# Patient Record
Sex: FEMALE | Race: WHITE | NOT HISPANIC OR LATINO | URBAN - METROPOLITAN AREA
[De-identification: names, ages, dates, MRNs, and addresses within clinical notes are randomized per-mention and may not be internally consistent; named-entity substitution may affect disease eponyms.]

---

## 2020-10-27 ENCOUNTER — EMERGENCY (EMERGENCY)
Facility: HOSPITAL | Age: 20
LOS: 0 days | Discharge: HOME | End: 2020-10-27
Attending: EMERGENCY MEDICINE | Admitting: EMERGENCY MEDICINE
Payer: COMMERCIAL

## 2020-10-27 VITALS
DIASTOLIC BLOOD PRESSURE: 78 MMHG | RESPIRATION RATE: 18 BRPM | HEART RATE: 116 BPM | WEIGHT: 100.97 LBS | OXYGEN SATURATION: 100 % | HEIGHT: 61 IN | SYSTOLIC BLOOD PRESSURE: 133 MMHG | TEMPERATURE: 99 F

## 2020-10-27 VITALS — HEART RATE: 88 BPM

## 2020-10-27 DIAGNOSIS — Z88.8 ALLERGY STATUS TO OTHER DRUGS, MEDICAMENTS AND BIOLOGICAL SUBSTANCES: ICD-10-CM
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
## 2020-10-27 DIAGNOSIS — S05.12XA CONTUSION OF EYEBALL AND ORBITAL TISSUES, LEFT EYE, INITIAL ENCOUNTER: ICD-10-CM
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
## 2020-10-27 DIAGNOSIS — F07.81 POSTCONCUSSIONAL SYNDROME: ICD-10-CM

## 2020-10-27 DIAGNOSIS — R11.0 NAUSEA: ICD-10-CM

## 2020-10-27 PROCEDURE — 99285 EMERGENCY DEPT VISIT HI MDM: CPT

## 2020-10-27 PROCEDURE — 70450 CT HEAD/BRAIN W/O DYE: CPT | Mod: 26

## 2020-10-27 PROCEDURE — 70480 CT ORBIT/EAR/FOSSA W/O DYE: CPT | Mod: 26,59

## 2020-10-27 RX ORDER — ACETAMINOPHEN 500 MG
975 TABLET ORAL ONCE
Refills: 0 | Status: COMPLETED | OUTPATIENT
Start: 2020-10-27 | End: 2020-10-27

## 2020-10-27 RX ORDER — ONDANSETRON 8 MG/1
4 TABLET, FILM COATED ORAL ONCE
Refills: 0 | Status: COMPLETED | OUTPATIENT
Start: 2020-10-27 | End: 2020-10-27

## 2020-10-27 RX ORDER — ONDANSETRON 8 MG/1
1 TABLET, FILM COATED ORAL
Qty: 9 | Refills: 0
Start: 2020-10-27 | End: 2020-10-29

## 2020-10-27 RX ADMIN — ONDANSETRON 4 MILLIGRAM(S): 8 TABLET, FILM COATED ORAL at 17:34

## 2020-10-27 RX ADMIN — Medication 975 MILLIGRAM(S): at 17:33

## 2020-10-27 NOTE — ED PROVIDER NOTE - CARE PROVIDER_API CALL
Sugey Cardona  [facility]AFF  [address] [place] [address]  Phone: [phone]  Fax: [phone]  Follow Up Time: 7-10 Days

## 2020-10-27 NOTE — ED PROVIDER NOTE - CARE PLAN
Principal Discharge DX:	Post-concussion syndrome  Secondary Diagnosis:	Vomiting  Secondary Diagnosis:	Periorbital ecchymosis of left eye, initial encounter  Secondary Diagnosis:	Headache

## 2020-10-27 NOTE — ED PROVIDER NOTE - NSFOLLOWUPINSTRUCTIONS_ED_ALL_ED_FT
Headache    A headache is pain or discomfort felt around the head or neck area. The specific cause of a headache may not be found as there are many types including tension headaches, migraine headaches, and cluster headaches. Watch your condition for any changes. Things you can do to manage your pain include taking over the counter and prescription medications as instructed by your health care provider, lying down in a dark quiet room, limiting stress, getting regular sleep, and refraining from alcohol and tobacco products.    SEEK IMMEDIATE MEDICAL CARE IF YOU EXPERIENCE THE FOLLOWING SYMPTOMS: fever, vomiting, stiff neck, loss of vision, problems with speech, muscle weakness, loss of balance, trouble walking, pass out, or confusion.    Concussion, Adult  A concussion is a brain injury from a direct hit (blow) to the head or body. This blow causes the brain to shake quickly back and forth inside the skull. This can damage brain cells and cause chemical changes in the brain. A concussion may also be known as a mild traumatic brain injury (TBI).    ImageConcussions are usually not life-threatening, but the effects of a concussion can be serious. If you have a concussion, you are more likely to experience concussion-like symptoms after a direct blow to the head in the future.    What are the causes?  This condition is caused by:    A direct blow to the head, such as from running into another player during a game, being hit in a fight, or hitting your head on a hard surface.  A jolt of the head or neck that causes the brain to move back and forth inside the skull, such as in a car crash.    What are the signs or symptoms?  The signs of a concussion can be hard to notice. Early on, they may be missed by you, family members, and health care providers. You may look fine but act or feel differently.    Symptoms are usually temporary, but they may last for days, weeks, or even longer. Some symptoms may appear right away but other symptoms may not show up for hours or days. Every head injury is different. Symptoms may include:    Headaches. This can include a feeling of pressure in the head.  Memory problems.  Trouble concentrating, organizing, or making decisions.  Slowness in thinking, acting or reacting, speaking, or reading.  Confusion.  Fatigue.  Changes in eating or sleeping patterns.  Problems with coordination or balance.  Nausea or vomiting.  Numbness or tingling.  Sensitivity to light or noise.  Vision or hearing problems.  Reduced sense of smell.  Irritability or mood changes.  Dizziness.  Lack of motivation.  Seeing or hearing things that other people do not see or hear (hallucinations).    How is this diagnosed?  This condition is diagnosed based on:    Your symptoms.  A description of your injury.    You may also have tests, including:    Imaging tests, such as a CT scan or MRI. These are done to look for signs of brain injury.  Neuropsychological tests. These measure your thinking, understanding, learning, and remembering abilities.    How is this treated?  This condition is treated with physical and mental rest and careful observation, usually at home. If the concussion is severe, you may need to stay home from work for a while. You may be referred to a concussion clinic or to other health care providers for management. It is important that you tell your health care provider if:    You are taking any medicines, including prescription medicines, over-the-counter medicines, and natural remedies. Some medicines, such as blood thinners (anticoagulants) and aspirin, may increase the chance of complications, such as bleeding.  You are taking or have taken alcohol or illegal drugs. Alcohol and certain other drugs may slow your recovery and can put you at risk of further injury.    How fast you will recover from a concussion depends on many factors, such as how severe your concussion is, what part of your brain was injured, how old you are, and how healthy you were before the concussion. Recovery can take time. It is important to wait to return to activity until a health care provider says it is safe to do that and your symptoms are completely gone.    Follow these instructions at home:  Activity     Limit activities that require a lot of thought or concentration. These may include:    Doing homework or job-related work.  Watching TV.  Working on the computer.  Playing memory games and puzzles.    Rest. Rest helps the brain to heal. Make sure you:    Get plenty of sleep at night. Avoid staying up late at night.  Keep the same bedtime hours on weekends and weekdays.  Rest during the day. Take naps or rest breaks when you feel tired.    Having another concussion before the first one has healed can be dangerous. Do not do high-risk activities that could cause a second concussion, such as riding a bicycle or playing sports.  Ask your health care provider when you can return to your normal activities, such as school, work, athletics, driving, riding a bicycle, or using heavy machinery. Your ability to react may be slower after a brain injury. Never do these activities if you are dizzy. Your health care provider will likely give you a plan for gradually returning to activities.  General instructions     Take over-the-counter and prescription medicines only as told by your health care provider.  Do not drink alcohol until your health care provider says you can.  If it is harder than usual to remember things, write them down.  If you are easily distracted, try to do one thing at a time. For example, do not try to watch TV while fixing dinner.  Talk with family members or close friends when making important decisions.  Watch your symptoms and tell others to do the same. Complications sometimes occur after a concussion. Older adults with a brain injury may have a higher risk of serious complications, such as a blood clot in the brain.  Tell your teachers, school nurse, school counselor, , , or  about your injury, symptoms, and restrictions. Tell them about what you can or cannot do. They should watch for:    Increased problems with attention or concentration.  Increased difficulty remembering or learning new information.  Increased time needed to complete tasks or assignments.  Increased irritability or decreased ability to cope with stress.  Increased symptoms.    Keep all follow-up visits as told by your health care provider. This is important.  How is this prevented?  It is very important to avoid another brain injury, especially as you recover. In rare cases, another injury can lead to permanent brain damage, brain swelling, or death. The risk of this is greatest during the first 7–10 days after a head injury. Avoid injuries by:    Wearing a seat belt when riding in a car.  Wearing a helmet when biking, skiing, skateboarding, skating, or doing similar activities.  Avoiding activities that could lead to a second concussion, such as contact or recreational sports, until your health care provider says it is okay.  Taking safety measures in your home, such as:    Removing clutter and tripping hazards from floors and stairways.  Using grab bars in bathrooms and handrails by stairs.  Placing non-slip mats on floors and in bathtubs.  Improving lighting in dim areas.      Contact a health care provider if:  Your symptoms get worse.  You have new symptoms.  You continue to have symptoms for more than 2 weeks.  Get help right away if:  You have severe or worsening headaches.  You have weakness or numbness in any part of your body.  Your coordination gets worse.  You vomit repeatedly.  You are sleepier.  The pupil of one eye is larger than the other.  You have convulsions or a seizure.  Your speech is slurred.  Your fatigue, confusion, or irritability gets worse.  You cannot recognize people or places.  You have neck pain.  It is difficult to wake you up.  You have unusual behavior changes.  You lose consciousness.  Summary  A concussion is a brain injury from a direct hit (blow) to the head or body.  A concussion may also be called a mild traumatic brain injury (TBI).  You may have imaging tests and neuropsychological tests to diagnose a concussion.  This condition is treated with physical and mental rest and careful observation.  Ask your health care provider when you can return to your normal activities, such as school, work, athletics, driving, riding a bicycle, or using heavy machinery. Follow safety instructions as told by your health care provider.  This information is not intended to replace advice given to you by your health care provider. Make sure you discuss any questions you have with your health care provider.

## 2020-10-27 NOTE — ED PROVIDER NOTE - CLINICAL SUMMARY MEDICAL DECISION MAKING FREE TEXT BOX
nv/post-concussive sx & L periorbital ecchymosis s/p head trauma - neuro exam nf - ct head/omfs no acute injuries - analgesia, zofran, all results d/w pt & copies given, strict return precautions discussed, rec outpt Concussion f/u

## 2020-10-27 NOTE — ED PROVIDER NOTE - ATTENDING CONTRIBUTION TO CARE
20F no pmh p/w head trauma & vomiting s/p mechanical fall. Pt was standing on a chair trying to reach a high cabinet, chair fell out from under her and she fell to floor. States she hit back of her head on floor, denies loc, states she sat on floor for a few minutes then got up and laid down on her bed and called her mother. C/o R post head pain & sustained mild L infra-orbital ecchymosis. Denies vision changes, neck pain, cp/sob, cough, focal numbness or weakness.    PE:  nad  skin warm, dry  ncat, +ttp R post parieto-occipital skull area, no hematoma or depressions  mild L infraorbital ecchymosis, no crepitus, perrl/eomi bl, non-painful, no hyphemas/hypopyons  neck supple no midline or paraspinal ttp  chest atx  rrr nl s1s2 no mrg  ctab no wrr  abd soft ntnd no palpable masses no rgr  back non-tender no cvat  ext no cce dpi  neuro gcs=15 aaox3, cn 2-12 intact bl no nystagmus or facial droop, 5/5 strength x 4 no drift, gross sensation intact, finger-nose nl, gait nl, romberg neg

## 2020-10-27 NOTE — ED PROVIDER NOTE - PATIENT PORTAL LINK FT
You can access the FollowMyHealth Patient Portal offered by [facility] by registering at the following website: [email or web address]. By joining [employer]’s FollowMyHealth portal, you will also be able to view your health information using other applications (apps) compatible with our system.

## 2020-10-27 NOTE — ED PROVIDER NOTE - OBJECTIVE STATEMENT
19 yo female with no pertinent pmh present s 21 yo female with no pertinent pmh present after a fall. pt states she was on a step stool to reach an object on top of her refrigerator when the stool 19 yo female with no pertinent pmh present after a fall. pt states she fell off a step stool while getting something off top of her refrigerator. pt states to have fell onto her back hitting her head with no LOC. pt had experienced n/v/headache since the event. denies any other pain at this time. denies any other symptoms including fevers, chill, headache, recent illness/travel, cough, abdominal pain, chest pain, or SOB.

## 2020-10-27 NOTE — ED ADULT NURSE NOTE - OBJECTIVE STATEMENT
pt presents s/p fall off chair at home and hit back of head on the tile floor. pt c/o n/v and dizziness

## 2020-10-27 NOTE — ED PROVIDER NOTE - PHYSICAL EXAMINATION
Gen: NAD, AOx3  Head: NCAT  HEENT: PERRL, oral mucosa moist, EOMI, normal conjunctiva, oropharynx clear without exudate or erythema, ecchymosis to L periorbit w/ TTP  Lung: CTAB, no respiratory distress, no wheezing, rales, rhonchi  CV: normal s1/s2, rrr, Normal perfusion, pulses 2+ throughout  Abd: soft, NTND, no CVA tenderness  Genitourinary: no pelvic tenderness  MSK: No edema, no visible deformities, full range of motion in all 4 extremities, no spinal tenderness   Neuro: CN II-XII grossly intact, No focal neurologic deficits, no nystagmus/pronator drift, coordination/sensation intact, strength 5/5 BUE/BLE, steady gait   Skin: No rash   Psych: normal affect

## 2021-01-04 ENCOUNTER — NON-APPOINTMENT (OUTPATIENT)
Age: 21
End: 2021-01-04

## 2021-01-04 ENCOUNTER — LABORATORY RESULT (OUTPATIENT)
Age: 21
End: 2021-01-04

## 2021-01-04 PROBLEM — Z00.00 ENCOUNTER FOR PREVENTIVE HEALTH EXAMINATION: Status: ACTIVE | Noted: 2021-01-04

## 2021-01-04 PROBLEM — Z78.9 OTHER SPECIFIED HEALTH STATUS: Chronic | Status: ACTIVE | Noted: 2020-10-27

## 2021-01-07 ENCOUNTER — LABORATORY RESULT (OUTPATIENT)
Age: 21
End: 2021-01-07

## 2021-01-07 ENCOUNTER — APPOINTMENT (OUTPATIENT)
Dept: OBGYN | Facility: CLINIC | Age: 21
End: 2021-01-07
Payer: COMMERCIAL

## 2021-01-07 VITALS — WEIGHT: 100 LBS | TEMPERATURE: 98 F | HEIGHT: 61 IN | BODY MASS INDEX: 18.88 KG/M2

## 2021-01-07 DIAGNOSIS — N91.1 SECONDARY AMENORRHEA: ICD-10-CM

## 2021-01-07 PROCEDURE — 99072 ADDL SUPL MATRL&STAF TM PHE: CPT

## 2021-01-07 PROCEDURE — 76830 TRANSVAGINAL US NON-OB: CPT

## 2021-01-07 PROCEDURE — 99203 OFFICE O/P NEW LOW 30 MIN: CPT | Mod: 25

## 2021-01-11 PROBLEM — N91.1 SECONDARY AMENORRHEA: Status: ACTIVE | Noted: 2021-01-11

## 2021-01-14 ENCOUNTER — NON-APPOINTMENT (OUTPATIENT)
Age: 21
End: 2021-01-14

## 2021-01-14 DIAGNOSIS — N89.8 OTHER SPECIFIED NONINFLAMMATORY DISORDERS OF VAGINA: ICD-10-CM

## 2021-01-16 ENCOUNTER — NON-APPOINTMENT (OUTPATIENT)
Age: 21
End: 2021-01-16

## 2021-01-27 ENCOUNTER — LABORATORY RESULT (OUTPATIENT)
Age: 21
End: 2021-01-27

## 2021-01-28 ENCOUNTER — APPOINTMENT (OUTPATIENT)
Dept: OBGYN | Facility: CLINIC | Age: 21
End: 2021-01-28
Payer: COMMERCIAL

## 2021-01-28 ENCOUNTER — NON-APPOINTMENT (OUTPATIENT)
Age: 21
End: 2021-01-28

## 2021-01-28 VITALS — TEMPERATURE: 98 F | WEIGHT: 102 LBS | BODY MASS INDEX: 19.26 KG/M2 | HEIGHT: 61 IN

## 2021-01-28 DIAGNOSIS — B37.3 CANDIDIASIS OF VULVA AND VAGINA: ICD-10-CM

## 2021-01-28 PROCEDURE — 0502F SUBSEQUENT PRENATAL CARE: CPT

## 2021-02-11 ENCOUNTER — APPOINTMENT (OUTPATIENT)
Dept: MATERNAL FETAL MEDICINE | Facility: CLINIC | Age: 21
End: 2021-02-11
Payer: COMMERCIAL

## 2021-02-11 ENCOUNTER — NON-APPOINTMENT (OUTPATIENT)
Age: 21
End: 2021-02-11

## 2021-02-11 PROCEDURE — 76801 OB US < 14 WKS SINGLE FETUS: CPT

## 2021-02-11 PROCEDURE — 99072 ADDL SUPL MATRL&STAF TM PHE: CPT

## 2021-02-11 PROCEDURE — 99243 OFF/OP CNSLTJ NEW/EST LOW 30: CPT

## 2021-02-11 PROCEDURE — 76813 OB US NUCHAL MEAS 1 GEST: CPT

## 2021-02-16 ENCOUNTER — APPOINTMENT (OUTPATIENT)
Dept: OBGYN | Facility: CLINIC | Age: 21
End: 2021-02-16

## 2021-02-16 ENCOUNTER — NON-APPOINTMENT (OUTPATIENT)
Age: 21
End: 2021-02-16

## 2021-02-22 ENCOUNTER — NON-APPOINTMENT (OUTPATIENT)
Age: 21
End: 2021-02-22

## 2021-02-24 ENCOUNTER — LABORATORY RESULT (OUTPATIENT)
Age: 21
End: 2021-02-24

## 2021-02-25 ENCOUNTER — NON-APPOINTMENT (OUTPATIENT)
Age: 21
End: 2021-02-25

## 2021-02-25 ENCOUNTER — APPOINTMENT (OUTPATIENT)
Dept: OBGYN | Facility: CLINIC | Age: 21
End: 2021-02-25
Payer: COMMERCIAL

## 2021-02-25 VITALS — BODY MASS INDEX: 19.45 KG/M2 | HEIGHT: 61 IN | TEMPERATURE: 98.2 F | WEIGHT: 103 LBS

## 2021-02-25 LAB
BILIRUB UR QL STRIP: NORMAL
CLARITY UR: CLEAR
GLUCOSE UR-MCNC: NORMAL
HCG UR QL: 0.2 EU/DL
HGB UR QL STRIP.AUTO: NORMAL
KETONES UR-MCNC: NORMAL
LEUKOCYTE ESTERASE UR QL STRIP: NORMAL
NITRITE UR QL STRIP: NORMAL
PH UR STRIP: 7
PROT UR STRIP-MCNC: NORMAL
SP GR UR STRIP: 1.02

## 2021-02-25 PROCEDURE — 0502F SUBSEQUENT PRENATAL CARE: CPT

## 2021-02-28 ENCOUNTER — LABORATORY RESULT (OUTPATIENT)
Age: 21
End: 2021-02-28

## 2021-03-08 ENCOUNTER — NON-APPOINTMENT (OUTPATIENT)
Age: 21
End: 2021-03-08

## 2021-03-09 ENCOUNTER — NON-APPOINTMENT (OUTPATIENT)
Age: 21
End: 2021-03-09

## 2021-03-18 ENCOUNTER — APPOINTMENT (OUTPATIENT)
Dept: OBGYN | Facility: CLINIC | Age: 21
End: 2021-03-18
Payer: COMMERCIAL

## 2021-03-18 VITALS — WEIGHT: 107 LBS | TEMPERATURE: 98.2 F | SYSTOLIC BLOOD PRESSURE: 100 MMHG | DIASTOLIC BLOOD PRESSURE: 60 MMHG

## 2021-03-18 PROCEDURE — 0502F SUBSEQUENT PRENATAL CARE: CPT

## 2021-03-18 RX ORDER — FLUCONAZOLE 150 MG/1
150 TABLET ORAL DAILY
Qty: 2 | Refills: 2 | Status: COMPLETED | COMMUNITY
Start: 2021-01-28 | End: 2021-03-18

## 2021-03-18 RX ORDER — METRONIDAZOLE 7.5 MG/G
0.75 GEL VAGINAL
Qty: 1 | Refills: 0 | Status: COMPLETED | COMMUNITY
Start: 2021-01-14 | End: 2021-03-18

## 2021-03-22 LAB
BILIRUB UR QL STRIP: NORMAL
GLUCOSE UR-MCNC: NORMAL
HCG UR QL: 0.2 EU/DL
HGB UR QL STRIP.AUTO: NORMAL
KETONES UR-MCNC: NORMAL
LEUKOCYTE ESTERASE UR QL STRIP: NORMAL
NITRITE UR QL STRIP: NORMAL
PH UR STRIP: 8
PROT UR STRIP-MCNC: NORMAL
SP GR UR STRIP: 1.02

## 2021-04-05 ENCOUNTER — NON-APPOINTMENT (OUTPATIENT)
Age: 21
End: 2021-04-05

## 2021-04-05 ENCOUNTER — APPOINTMENT (OUTPATIENT)
Dept: OBGYN | Facility: CLINIC | Age: 21
End: 2021-04-05
Payer: COMMERCIAL

## 2021-04-05 VITALS — BODY MASS INDEX: 20.77 KG/M2 | WEIGHT: 110 LBS | HEIGHT: 61 IN | TEMPERATURE: 98.3 F

## 2021-04-05 PROCEDURE — 0502F SUBSEQUENT PRENATAL CARE: CPT

## 2021-04-08 ENCOUNTER — APPOINTMENT (OUTPATIENT)
Dept: OBGYN | Facility: CLINIC | Age: 21
End: 2021-04-08
Payer: SELF-PAY

## 2021-04-08 PROCEDURE — 76805 OB US >/= 14 WKS SNGL FETUS: CPT

## 2021-04-09 ENCOUNTER — NON-APPOINTMENT (OUTPATIENT)
Age: 21
End: 2021-04-09

## 2021-04-28 ENCOUNTER — APPOINTMENT (OUTPATIENT)
Dept: OBGYN | Facility: CLINIC | Age: 21
End: 2021-04-28
Payer: MEDICAID

## 2021-04-28 VITALS — DIASTOLIC BLOOD PRESSURE: 60 MMHG | WEIGHT: 114 LBS | SYSTOLIC BLOOD PRESSURE: 100 MMHG | TEMPERATURE: 98 F

## 2021-04-28 PROCEDURE — 99213 OFFICE O/P EST LOW 20 MIN: CPT

## 2021-04-29 LAB
BILIRUB UR QL STRIP: NORMAL
GLUCOSE UR-MCNC: NORMAL
HCG UR QL: 0.2 EU/DL
HGB UR QL STRIP.AUTO: NORMAL
KETONES UR-MCNC: NORMAL
LEUKOCYTE ESTERASE UR QL STRIP: NORMAL
NITRITE UR QL STRIP: NORMAL
PH UR STRIP: 7.5
PROT UR STRIP-MCNC: NORMAL
SP GR UR STRIP: 1.02

## 2021-05-01 LAB
C TRACH RRNA SPEC QL NAA+PROBE: NOT DETECTED
N GONORRHOEA RRNA SPEC QL NAA+PROBE: NOT DETECTED
SOURCE AMPLIFICATION: NORMAL

## 2021-05-04 ENCOUNTER — LABORATORY RESULT (OUTPATIENT)
Age: 21
End: 2021-05-04

## 2021-05-04 ENCOUNTER — NON-APPOINTMENT (OUTPATIENT)
Age: 21
End: 2021-05-04

## 2021-05-10 ENCOUNTER — NON-APPOINTMENT (OUTPATIENT)
Age: 21
End: 2021-05-10

## 2021-05-11 ENCOUNTER — NON-APPOINTMENT (OUTPATIENT)
Age: 21
End: 2021-05-11

## 2021-05-11 ENCOUNTER — APPOINTMENT (OUTPATIENT)
Dept: OBGYN | Facility: CLINIC | Age: 21
End: 2021-05-11
Payer: MEDICAID

## 2021-05-11 VITALS — WEIGHT: 118 LBS | TEMPERATURE: 97.8 F

## 2021-05-11 LAB
BILIRUB UR QL STRIP: NORMAL
GLUCOSE UR-MCNC: NORMAL
HCG UR QL: 0.2 EU/DL
HGB UR QL STRIP.AUTO: NORMAL
KETONES UR-MCNC: NORMAL
LEUKOCYTE ESTERASE UR QL STRIP: NORMAL
NITRITE UR QL STRIP: NORMAL
PH UR STRIP: 6.5
PROT UR STRIP-MCNC: NORMAL
SP GR UR STRIP: 1.01

## 2021-05-11 PROCEDURE — 99213 OFFICE O/P EST LOW 20 MIN: CPT

## 2021-05-11 PROCEDURE — 99072 ADDL SUPL MATRL&STAF TM PHE: CPT

## 2021-05-18 ENCOUNTER — APPOINTMENT (OUTPATIENT)
Dept: OBGYN | Facility: CLINIC | Age: 21
End: 2021-05-18

## 2021-05-27 ENCOUNTER — EMERGENCY (EMERGENCY)
Facility: HOSPITAL | Age: 21
LOS: 0 days | Discharge: HOME | End: 2021-05-27
Attending: EMERGENCY MEDICINE | Admitting: EMERGENCY MEDICINE
Payer: MEDICAID

## 2021-05-27 VITALS
RESPIRATION RATE: 18 BRPM | DIASTOLIC BLOOD PRESSURE: 66 MMHG | OXYGEN SATURATION: 98 % | HEART RATE: 78 BPM | SYSTOLIC BLOOD PRESSURE: 122 MMHG | TEMPERATURE: 98 F | HEIGHT: 61 IN

## 2021-05-27 DIAGNOSIS — Z88.0 ALLERGY STATUS TO PENICILLIN: ICD-10-CM

## 2021-05-27 DIAGNOSIS — Z20.822 CONTACT WITH AND (SUSPECTED) EXPOSURE TO COVID-19: ICD-10-CM

## 2021-05-27 DIAGNOSIS — R11.0 NAUSEA: ICD-10-CM

## 2021-05-27 DIAGNOSIS — R10.31 RIGHT LOWER QUADRANT PAIN: ICD-10-CM
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
## 2021-05-27 DIAGNOSIS — Z3A.28 28 WEEKS GESTATION OF PREGNANCY: ICD-10-CM
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
## 2021-05-27 DIAGNOSIS — O9A.212 INJURY, POISONING AND CERTAIN OTHER CONSEQUENCES OF EXTERNAL CAUSES COMPLICATING PREGNANCY, SECOND TRIMESTER: ICD-10-CM

## 2021-05-27 LAB
ALBUMIN SERPL ELPH-MCNC: 3.5 G/DL — SIGNIFICANT CHANGE UP (ref 3.5–5.2)
ALP SERPL-CCNC: 66 U/L — SIGNIFICANT CHANGE UP (ref 30–115)
ALT FLD-CCNC: 9 U/L — SIGNIFICANT CHANGE UP (ref 0–41)
ANION GAP SERPL CALC-SCNC: 11 MMOL/L — SIGNIFICANT CHANGE UP (ref 7–14)
APPEARANCE UR: CLEAR — SIGNIFICANT CHANGE UP
AST SERPL-CCNC: 16 U/L — SIGNIFICANT CHANGE UP (ref 0–41)
BACTERIA # UR AUTO: NEGATIVE — SIGNIFICANT CHANGE UP
BASOPHILS # BLD AUTO: 0 K/UL — SIGNIFICANT CHANGE UP (ref 0–0.2)
BASOPHILS NFR BLD AUTO: 0 % — SIGNIFICANT CHANGE UP (ref 0–1)
BILIRUB SERPL-MCNC: <0.2 MG/DL — SIGNIFICANT CHANGE UP (ref 0.2–1.2)
BILIRUB UR-MCNC: NEGATIVE — SIGNIFICANT CHANGE UP
BLD GP AB SCN SERPL QL: SIGNIFICANT CHANGE UP
BUN SERPL-MCNC: 6 MG/DL — LOW (ref 10–20)
CALCIUM SERPL-MCNC: 8.8 MG/DL — SIGNIFICANT CHANGE UP (ref 8.5–10.1)
CHLORIDE SERPL-SCNC: 106 MMOL/L — SIGNIFICANT CHANGE UP (ref 98–110)
CO2 SERPL-SCNC: 23 MMOL/L — SIGNIFICANT CHANGE UP (ref 17–32)
COLOR SPEC: COLORLESS — SIGNIFICANT CHANGE UP
CREAT SERPL-MCNC: 0.6 MG/DL — LOW (ref 0.7–1.5)
DIFF PNL FLD: NEGATIVE — SIGNIFICANT CHANGE UP
EOSINOPHIL # BLD AUTO: 0.39 K/UL — SIGNIFICANT CHANGE UP (ref 0–0.7)
EOSINOPHIL NFR BLD AUTO: 3.5 % — SIGNIFICANT CHANGE UP (ref 0–8)
EPI CELLS # UR: 1 /HPF — SIGNIFICANT CHANGE UP (ref 0–5)
GIANT PLATELETS BLD QL SMEAR: PRESENT — SIGNIFICANT CHANGE UP
GLUCOSE SERPL-MCNC: 80 MG/DL — SIGNIFICANT CHANGE UP (ref 70–99)
GLUCOSE UR QL: NEGATIVE — SIGNIFICANT CHANGE UP
HCT VFR BLD CALC: 35.8 % — LOW (ref 37–47)
HGB BLD-MCNC: 11.9 G/DL — LOW (ref 12–16)
HYALINE CASTS # UR AUTO: 0 /LPF — SIGNIFICANT CHANGE UP (ref 0–7)
KETONES UR-MCNC: NEGATIVE — SIGNIFICANT CHANGE UP
LEUKOCYTE ESTERASE UR-ACNC: ABNORMAL
LIDOCAIN IGE QN: 30 U/L — SIGNIFICANT CHANGE UP (ref 7–60)
LYMPHOCYTES # BLD AUTO: 1.35 K/UL — SIGNIFICANT CHANGE UP (ref 1.2–3.4)
LYMPHOCYTES # BLD AUTO: 12.3 % — LOW (ref 20.5–51.1)
MANUAL SMEAR VERIFICATION: SIGNIFICANT CHANGE UP
MCHC RBC-ENTMCNC: 31.8 PG — HIGH (ref 27–31)
MCHC RBC-ENTMCNC: 33.2 G/DL — SIGNIFICANT CHANGE UP (ref 32–37)
MCV RBC AUTO: 95.7 FL — SIGNIFICANT CHANGE UP (ref 81–99)
METAMYELOCYTES # FLD: 1.8 % — HIGH (ref 0–0)
MONOCYTES # BLD AUTO: 0.48 K/UL — SIGNIFICANT CHANGE UP (ref 0.1–0.6)
MONOCYTES NFR BLD AUTO: 4.4 % — SIGNIFICANT CHANGE UP (ref 1.7–9.3)
MYELOCYTES NFR BLD: 2.6 % — HIGH (ref 0–0)
NEUTROPHILS # BLD AUTO: 8.29 K/UL — HIGH (ref 1.4–6.5)
NEUTROPHILS NFR BLD AUTO: 72.8 % — SIGNIFICANT CHANGE UP (ref 42.2–75.2)
NEUTS BAND # BLD: 2.6 % — SIGNIFICANT CHANGE UP (ref 0–6)
NITRITE UR-MCNC: NEGATIVE — SIGNIFICANT CHANGE UP
NRBC # BLD: 1 /100 — HIGH (ref 0–0)
NRBC # BLD: SIGNIFICANT CHANGE UP /100 WBCS (ref 0–0)
PH UR: 7.5 — SIGNIFICANT CHANGE UP (ref 5–8)
PLAT MORPH BLD: NORMAL — SIGNIFICANT CHANGE UP
PLATELET # BLD AUTO: 205 K/UL — SIGNIFICANT CHANGE UP (ref 130–400)
POLYCHROMASIA BLD QL SMEAR: SLIGHT — SIGNIFICANT CHANGE UP
POTASSIUM SERPL-MCNC: 3.5 MMOL/L — SIGNIFICANT CHANGE UP (ref 3.5–5)
POTASSIUM SERPL-SCNC: 3.5 MMOL/L — SIGNIFICANT CHANGE UP (ref 3.5–5)
PROT SERPL-MCNC: 5.6 G/DL — LOW (ref 6–8)
PROT UR-MCNC: NEGATIVE — SIGNIFICANT CHANGE UP
RAPID RVP RESULT: SIGNIFICANT CHANGE UP
RBC # BLD: 3.74 M/UL — LOW (ref 4.2–5.4)
RBC # FLD: 13.2 % — SIGNIFICANT CHANGE UP (ref 11.5–14.5)
RBC BLD AUTO: ABNORMAL
RBC CASTS # UR COMP ASSIST: 1 /HPF — SIGNIFICANT CHANGE UP (ref 0–4)
SARS-COV-2 RNA SPEC QL NAA+PROBE: SIGNIFICANT CHANGE UP
SODIUM SERPL-SCNC: 140 MMOL/L — SIGNIFICANT CHANGE UP (ref 135–146)
SP GR SPEC: 1 — LOW (ref 1.01–1.03)
TOXIC GRANULES BLD QL SMEAR: PRESENT — SIGNIFICANT CHANGE UP
UROBILINOGEN FLD QL: SIGNIFICANT CHANGE UP
WBC # BLD: 11 K/UL — HIGH (ref 4.8–10.8)
WBC # FLD AUTO: 11 K/UL — HIGH (ref 4.8–10.8)
WBC UR QL: 7 /HPF — HIGH (ref 0–5)

## 2021-05-27 PROCEDURE — 99285 EMERGENCY DEPT VISIT HI MDM: CPT

## 2021-05-27 PROCEDURE — 71045 X-RAY EXAM CHEST 1 VIEW: CPT | Mod: 26

## 2021-05-27 PROCEDURE — 99283 EMERGENCY DEPT VISIT LOW MDM: CPT

## 2021-05-27 PROCEDURE — 93010 ELECTROCARDIOGRAM REPORT: CPT

## 2021-05-27 RX ORDER — ONDANSETRON 8 MG/1
4 TABLET, FILM COATED ORAL ONCE
Refills: 0 | Status: COMPLETED | OUTPATIENT
Start: 2021-05-27 | End: 2021-05-27

## 2021-05-27 RX ORDER — ACETAMINOPHEN 500 MG
650 TABLET ORAL ONCE
Refills: 0 | Status: COMPLETED | OUTPATIENT
Start: 2021-05-27 | End: 2021-05-27

## 2021-05-27 RX ORDER — SODIUM CHLORIDE 9 MG/ML
1000 INJECTION, SOLUTION INTRAVENOUS ONCE
Refills: 0 | Status: COMPLETED | OUTPATIENT
Start: 2021-05-27 | End: 2021-05-27

## 2021-05-27 RX ADMIN — ONDANSETRON 4 MILLIGRAM(S): 8 TABLET, FILM COATED ORAL at 10:39

## 2021-05-27 RX ADMIN — SODIUM CHLORIDE 1000 MILLILITER(S): 9 INJECTION, SOLUTION INTRAVENOUS at 10:38

## 2021-05-27 NOTE — ED PROVIDER NOTE - PROGRESS NOTE DETAILS
BK: Bedside ultrasound shows FHR of 138 and negative FAST. Discussed case with OBGYN team. Will get basic labs before sending to OB. Trauma team recommends CXR for now and will be evaluated by Dr. Gomez. MR abd/pelvis ordered as per trauma, OB bedside with tocometer. The patient wishes to leave against medical advice.  I have discussed the risks, benefits and alternatives (including the possibility of worsening of disease, pain, permanent disability, and/or death) with the patient and his/her family (if available).  The patient voices understanding of these risks, benefits, and alternatives and still wishes to sign out against medical advice.  The patient is awake, alert, oriented x 3 and has demonstrated capacity to refuse/direct care.  I have advised the patient that they can and should return immediately should they develop any worse/different/additional symptoms, or if they change their mind and want to continue their care. Patient has full capacity and has verbalized understanding.  Given detailed returned precautions.

## 2021-05-27 NOTE — CONSULT NOTE ADULT - ATTENDING COMMENTS
pelvic tenderness with some guarding   28 Weeks pregnant   will obtain MRI of abd/Plvs   dispo pending MRI Results.

## 2021-05-27 NOTE — ED PROVIDER NOTE - PHYSICAL EXAMINATION
CONSTITUTIONAL: well-appearing pregnant female, in NAD  SKIN: Warm dry, normal skin turgor  HEAD: NCAT  EYES: EOMI, PERRLA, no scleral icterus, conjunctiva pink  ENT: normal pharynx with no erythema or exudates  NECK: Supple; non tender. Full ROM.  CARD: RRR, no murmurs.  RESP: clear to ausculation b/l. No crackles or wheezing.  ABD: soft, mildly tender to palpation of RLQ, non-distended, no rebound or guarding.  EXT: Full ROM, no bony tenderness, no pedal edema, no calf tenderness  NEURO: normal motor. normal sensory. CN II-XII intact. Normal gait.  PSYCH: Cooperative, appropriate.
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
## 2021-05-27 NOTE — ED PROVIDER NOTE - WR INTERPRETED BY 1
incomplete     HPI:  51 y/o F from [place], bedbound due to difficulty walking likely 2/2 to polyneuropathy with PMHx of anemia, HFpEF, ESRD on HD, Clostridium difficile infection, DM, Diabetic neuropathy, HTN, HLD, Hypothyroidism, OM of jaw, Parathyroid adenoma, and GERD was sent from NH due to leukocytosis of 20. Patient denies any complaints on admission including cough, SOB, chest pain, abdominal pain, nausea, vomiting, diarrhea, or pain at the sacral decubitus ulcer site. In the ED, patient was afebrile with leukocytosis of 19.4. She was last admitted at [facility] in March for sepsis. She underwent I&D on 3/8/18 of decubitus ulcer. Wound cx grew MRSA and patient continued to be febrile so she underwent redebridement on 3/18/18 and completed 2 weeks of Vancomycin on discharge. Currently she denies any complaints at all.    SH: Denies smoking, alcohol or illicit drug use. Wheelchair bound.   NKDA (2018 06:03)      PAST MEDICAL & SURGICAL HISTORY:  ESRD (end stage renal disease) on dialysis via right chest HD catheter   Clostridium difficile infection  Osteomyelitis of jaw  Parathyroid adenoma  Hypothyroidism  CKD (chronic kidney disease)  Anemia  CHF (congestive heart failure)  Diabetic neuropathy  Diabetes mellitus  HTN (hypertension)  S/P :   sacral decubitus debridement 3/2018        ALLERGIES: No Known Allergies      MEDS:  acetaminophen   Tablet 650 milliGRAM(s) Oral every 6 hours PRN  ascorbic acid 500 milliGRAM(s) Oral daily  cinacalcet 30 milliGRAM(s) Oral daily  dextrose 5%. 1000 milliLiter(s) IV Continuous <Continuous>  dextrose 50% Injectable 12.5 Gram(s) IV Push once  dextrose 50% Injectable 25 Gram(s) IV Push once  dextrose 50% Injectable 25 Gram(s) IV Push once  dextrose Gel 1 Dose(s) Oral once PRN  docusate sodium 100 milliGRAM(s) Oral two times a day  epoetin jacqueline Injectable 4000 Unit(s) IV Push <User Schedule>  ferrous    sulfate Liquid 300 milliGRAM(s) Enteral Tube daily  folic acid 1 milliGRAM(s) Oral daily  gabapentin 100 milliGRAM(s) Oral three times a day  glucagon  Injectable 1 milliGRAM(s) IntraMuscular once PRN  heparin  Injectable 5000 Unit(s) SubCutaneous every 8 hours  hydrALAZINE 50 milliGRAM(s) Oral three times a day  insulin lispro (HumaLOG) corrective regimen sliding scale   SubCutaneous three times a day before meals  levothyroxine 50 MICROGram(s) Oral daily  meropenem  IVPB 500 milliGRAM(s) IV Intermittent every 24 hours  metoprolol tartrate 25 milliGRAM(s) Oral two times a day  multivitamin 1 Tablet(s) Oral daily  multivitamin 1 Tablet(s) Oral daily  oxyCODONE    5 mG/acetaminophen 325 mG 1 Tablet(s) Oral every 6 hours PRN  pantoprazole    Tablet 40 milliGRAM(s) Oral before breakfast  senna 2 Tablet(s) Oral at bedtime  simvastatin 20 milliGRAM(s) Oral at bedtime      SOCIAL HISTORY:  Smoker:  NO       FAMILY HISTORY:  Family history of stroke  Family history of hypertension (Sibling)  Family history of diabetes mellitus      VITALS:  Vital Signs Last 24 Hrs  T(C): 36.7 (2018 05:04), Max: 36.8 (2018 15:26)  T(F): 98.1 (2018 05:04), Max: 98.3 (2018 15:26)  HR: 82 (2018 05:04) (73 - 84)  BP: 127/66 (2018 05:04) (110/54 - 137/59)  BP(mean): --  RR: 16 (2018 05:04) (16 - 18)  SpO2: 98% (2018 05:04) (98% - 100%)    LABS/DIAGNOSTIC TESTS:                        9.1    15.7  )-----------( 376      ( 2018 07:14 )             32.1     WBC Count: 15.7 K/uL ( @ 07:14)  WBC Count: 17.0 K/uL ( @ 18:21)  WBC Count: 18.0 K/uL ( @ 10:55)  WBC Count: 19.4 K/uL ( @ 02:32)        138  |  103  |  30<H>  ----------------------------<  115<H>  4.8   |  29  |  3.27<H>    Ca    9.4      2018 07:14  Phos  3.4     04-06  Mg     2.6     04-07      PT/INR - ( 2018 07:14 )   PT: 10.5 sec;   INR: 0.96 ratio         LACTATE:  Lactate, Blood (18 @ 02:32)    Lactate, Blood: 1.1 mmol/L    Comprehensive Metabolic Panel (18 @ 02:32)    Aspartate Aminotransferase (AST/SGOT): 13 U/L    Comprehensive Metabolic Panel (18 @ 02:32)    Alanine Aminotransferase (ALT/SGPT): 10 U/L DA      CULTURES:   .Blood Blood-Peripheral   @ 09:59   No growth to date.  --  --      .Blood Blood-Peripheral  03-10 @ 09:57   No growth at 5 days.  --  --      .Surgical Swab left back mass culture   @ 12:14   Numerous Methicillin resistant Staphylococcus aureus  --  Methicillin resistant Staphylococcus aureus      .Surgical Swab right side of back   @ 12:13   Numerous Methicillin resistant Staphylococcus aureus  ***********Note************  This isolate demonstrates inducible  clindamycin resistance.  Clindamycin may still be effective in some patients.  --  Methicillin resistant Staphylococcus aureus      .Blood Dialysis Catheter   @ 23:03   No growth at 5 days.  --  --      .Blood Blood-Peripheral   @ 09:43   No growth at 5 days.  --  --      .Urine Catheterized   @ 09:38   <10,000 CFU/ml Normal Urogenital derrick present  --  --      .Blood Blood  01-10 @ 00:04   No growth at 5 days.  --  --      RADIOLOGY:    < from: Xray Chest 1 View AP/PA (18 @ 04:26) >  EXAM:  XR CHEST AP OR PA 1V                            PROCEDURE DATE:  2018          INTERPRETATION:  AP chest on 2018 at 2:30 AM. Patient has   abnormal laboratory values and leukocytosis.    Heart is magnified by technique. Large-caliber double-lumen right jugular   line again noted.    Very prominent main pulmonary artery suggests pulmonary hypertension.    Left jugular line seen on  is been removed.    There is a dense longitudinal line paralleling the descending thoracic   aorta behind the heart again noted. This likely represents chronic   atelectasis or scarring.    Present film shows a slight hazy density in the right lower lung field   possibly minimal CHF. This may be increased from .    IMPRESSION:As above.      CHRISTIAN HARTLEY M.D., ATTENDING RADIOLOGIST  This document has been electronically signed. 2018  8:44AM        < end of copied text > Leonid Martines HPI:  51 y/o F from [place], bedbound due to difficulty walking likely 2/2 to polyneuropathy with PMHx of anemia, HFpEF, ESRD on HD, Clostridium difficile infection, DM, Diabetic neuropathy, HTN, HLD, Hypothyroidism, OM of jaw, Parathyroid adenoma, and GERD was sent from NH due to leukocytosis of 20. Patient denies any complaints on admission including cough, SOB, chest pain, abdominal pain, nausea, vomiting, diarrhea, or pain at the sacral decubitus ulcer site. In the ED, patient was afebrile with leukocytosis of 19.4. She was last admitted at [facility] in March for sepsis. She underwent I&D on 3/8/18 of decubitus ulcer. Wound cx grew MRSA and patient continued to be febrile so she underwent redebridement on 3/18/18 and completed 2 weeks of Vancomycin on discharge. Currently she denies any complaints at all.    SH: Denies smoking, alcohol or illicit drug use. Wheelchair bound.   NKDA (2018 06:03)      PAST MEDICAL & SURGICAL HISTORY:  ESRD (end stage renal disease) on dialysis via right chest HD catheter   Clostridium difficile infection  Osteomyelitis of jaw  Parathyroid adenoma  Hypothyroidism  CKD (chronic kidney disease)  Anemia  CHF (congestive heart failure)  Diabetic neuropathy  Diabetes mellitus  HTN (hypertension)  S/P :   sacral decubitus debridement 3/2018        ALLERGIES: No Known Allergies      MEDS:  acetaminophen   Tablet 650 milliGRAM(s) Oral every 6 hours PRN  ascorbic acid 500 milliGRAM(s) Oral daily  cinacalcet 30 milliGRAM(s) Oral daily  dextrose 5%. 1000 milliLiter(s) IV Continuous <Continuous>  dextrose 50% Injectable 12.5 Gram(s) IV Push once  dextrose 50% Injectable 25 Gram(s) IV Push once  dextrose 50% Injectable 25 Gram(s) IV Push once  dextrose Gel 1 Dose(s) Oral once PRN  docusate sodium 100 milliGRAM(s) Oral two times a day  epoetin jacqueline Injectable 4000 Unit(s) IV Push <User Schedule>  ferrous    sulfate Liquid 300 milliGRAM(s) Enteral Tube daily  folic acid 1 milliGRAM(s) Oral daily  gabapentin 100 milliGRAM(s) Oral three times a day  glucagon  Injectable 1 milliGRAM(s) IntraMuscular once PRN  heparin  Injectable 5000 Unit(s) SubCutaneous every 8 hours  hydrALAZINE 50 milliGRAM(s) Oral three times a day  insulin lispro (HumaLOG) corrective regimen sliding scale   SubCutaneous three times a day before meals  levothyroxine 50 MICROGram(s) Oral daily  meropenem  IVPB 500 milliGRAM(s) IV Intermittent every 24 hours  metoprolol tartrate 25 milliGRAM(s) Oral two times a day  multivitamin 1 Tablet(s) Oral daily  multivitamin 1 Tablet(s) Oral daily  oxyCODONE    5 mG/acetaminophen 325 mG 1 Tablet(s) Oral every 6 hours PRN  pantoprazole    Tablet 40 milliGRAM(s) Oral before breakfast  senna 2 Tablet(s) Oral at bedtime  simvastatin 20 milliGRAM(s) Oral at bedtime      SOCIAL HISTORY:  Smoker:  NO       FAMILY HISTORY:  Family history of stroke  Family history of hypertension (Sibling)  Family history of diabetes mellitus      VITALS:  Vital Signs Last 24 Hrs  T(C): 36.7 (2018 05:04), Max: 36.8 (2018 15:26)  T(F): 98.1 (2018 05:04), Max: 98.3 (2018 15:26)  HR: 82 (2018 05:04) (73 - 84)  BP: 127/66 (2018 05:04) (110/54 - 137/59)  BP(mean): --  RR: 16 (2018 05:04) (16 - 18)  SpO2: 98% (2018 05:04) (98% - 100%)    LABS/DIAGNOSTIC TESTS:                        9.1    15.7  )-----------( 376      ( 2018 07:14 )             32.1     WBC Count: 15.7 K/uL ( @ 07:14)  WBC Count: 17.0 K/uL ( @ 18:21)  WBC Count: 18.0 K/uL ( @ 10:55)  WBC Count: 19.4 K/uL ( @ 02:32)        138  |  103  |  30<H>  ----------------------------<  115<H>  4.8   |  29  |  3.27<H>    Ca    9.4      2018 07:14  Phos  3.4     04-06  Mg     2.6     04-07      PT/INR - ( 2018 07:14 )   PT: 10.5 sec;   INR: 0.96 ratio         LACTATE:  Lactate, Blood (18 @ 02:32)    Lactate, Blood: 1.1 mmol/L    Comprehensive Metabolic Panel (18 @ 02:32)    Aspartate Aminotransferase (AST/SGOT): 13 U/L    Comprehensive Metabolic Panel (18 @ 02:32)    Alanine Aminotransferase (ALT/SGPT): 10 U/L DA      CULTURES:   .Blood Blood-Peripheral   @ 09:59   No growth to date.  --  --      .Blood Blood-Peripheral  03-10 @ 09:57   No growth at 5 days.  --  --      .Surgical Swab left back mass culture   @ 12:14   Numerous Methicillin resistant Staphylococcus aureus  --  Methicillin resistant Staphylococcus aureus      .Surgical Swab right side of back   @ 12:13   Numerous Methicillin resistant Staphylococcus aureus  ***********Note************  This isolate demonstrates inducible  clindamycin resistance.  Clindamycin may still be effective in some patients.  --  Methicillin resistant Staphylococcus aureus      .Blood Dialysis Catheter   @ 23:03   No growth at 5 days.  --  --      .Blood Blood-Peripheral   @ 09:43   No growth at 5 days.  --  --      .Urine Catheterized   @ 09:38   <10,000 CFU/ml Normal Urogenital derrick present  --  --      .Blood Blood  -10 @ 00:04   No growth at 5 days.  --  --      RADIOLOGY:    < from: Xray Chest 1 View AP/PA (18 @ 04:26) >  EXAM:  XR CHEST AP OR PA 1V                            PROCEDURE DATE:  2018          INTERPRETATION:  AP chest on 2018 at 2:30 AM. Patient has   abnormal laboratory values and leukocytosis.    Heart is magnified by technique. Large-caliber double-lumen right jugular   line again noted.    Very prominent main pulmonary artery suggests pulmonary hypertension.    Left jugular line seen on  is been removed.    There is a dense longitudinal line paralleling the descending thoracic   aorta behind the heart again noted. This likely represents chronic   atelectasis or scarring.    Present film shows a slight hazy density in the right lower lung field   possibly minimal CHF. This may be increased from .    IMPRESSION:As above.      CHRISTIAN HARTLEY M.D., ATTENDING RADIOLOGIST  This document has been electronically signed. 2018  8:44AM        < end of copied text >
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
## 2021-05-27 NOTE — ED PROVIDER NOTE - PATIENT PORTAL LINK FT
You can access the FollowMyHealth Patient Portal offered by [facility] by registering at the following website: [email or web address]. By joining [facility]’s FollowMyHealth portal, you will also be able to view your health information using other applications (apps) compatible with our system.
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
## 2021-05-27 NOTE — ED PROVIDER NOTE - CARE PROVIDER_API CALL
Preston Lawton  Obstetrics and Gynecology  [address] [place] [address]  Phone: [phone]  Fax: [phone]  Follow Up Time:

## 2021-05-27 NOTE — ED ADULT NURSE NOTE - NSIMPLEMENTINTERV_GEN_ALL_ED
Implemented All Universal Safety Interventions:  [employer] to call system. Call bell, personal items and telephone within reach. Instruct patient to call for assistance. Room bathroom lighting operational. Non-slip footwear when patient is off stretcher. Physically safe environment: no spills, clutter or unnecessary equipment. Stretcher in lowest position, wheels locked, appropriate side rails in place.

## 2021-05-27 NOTE — CONSULT NOTE ADULT - ASSESSMENT
21yo  @28w0d, s/p fall, now wander irregularly, BPP 10/10, r/o abruption    -Patient counseled about need for prolonged monitoring for   -Rh positive  -f/u  21yo  @28w0d, s/p fall, now wander irregularly, BPP 10/10, r/o abruption    -Patient counseled about need for prolonged monitoring for suspected abruption after abdominal trauma, especially in the setting of irregular contractions at 28wks. She refused prolonged monitoring and opted to go home.  -Rh positive  -f/u fibrinogen, coags  -labor precautions discussed  -f/u during next appointment next week    Dr. Brennan and Dr. Lawton aware
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
## 2021-05-27 NOTE — ED PROVIDER NOTE - IV ALTEPASE ADMIN HIDDEN
"Office Visit    Assessment      1. Vaginal discharge. 2. Low back pain. PLAN    1. Specimens are sent for urinalysis and vaginal pathogens. 2. She is scheduled for an ultrasound of her pelvis to evaluate her continued right lower quadrant abdominal pain. .  3. Further management dependent on the above results. Orders Placed This Encounter   â¢ URINALYSIS DIPSTICK ONLY / BACK OFFICE   â¢ Urinalysis with Micro & Culture if Indicated   â¢ Vaginal Pathogens   â¢ US Pelvis Complete Non OB     Return if symptoms worsen or fail to improve. CHIEF COMPLAINT    Chief Complaint   Patient presents with   â¢ Infection     Pain, discharge, back pain x 3/4 days          History of present illness    She is here today for ""fishy smelling, creamy\"" vaginal discharge that has been going on for the past 3-4 days. She denies any itching. She denies any fever. She states that she is having right lower quadrant abdominal pain that has been going on intermittently since July. At that time she did have a pelvic ultrasound which showed a 1.3 cm likely complex ovarian cyst She denies dysuria, frequency, urgency, nocturia or hesitancy. The patient complains of pain across her low back that has also been going on for the past 3-4 days. She states the discomfort isn't aching type of pain. She states that the pain comes and goes but is worse in the morning. She denies any injury or unusual activity. I have reviewed the allergies, medication list and past medical, family and social history sections listed in the above medical record as well as any pertinent nursing notes. Physical Exam    Vital Signs:    Vitals:    10/25/18 1435   BP: 116/70   Pulse: 86   Weight: 52.3 kg   Height: 5' (1.524 m)   Body mass index is 22.54 kg/mÂ². General:  Well developed, well nourished. In no apparent distress. Skin:   Warm, dry and well hydrated to inspection and palpation. Respiratory:  Normal respiratory effort.   No chest wall " tenderness. Lungs clear to auscultation bilaterally. Symmetrical chest expansion. Cardiovascular:   Regular rate and rhythm. No murmurs. No peripheral edema. Abdomen: Bowel sounds are present in all 4 quadrants. Abdomen is soft and nontender on palpation. There is no hepatosplenomegaly or masses appreciated. .  Genitourinary:  No swelling tenderness or lesions to the external genitalia. Internal genitalia: Vagina is pink and well rugated. No lesions. Cervix is pink with no lesions. There is creamy white discharge in the vagina. Bimanual exam: Uterus is small and firm. No adnexal masses enlargement or tenderness. There is no cervical motion tenderness. Musculoskeletal: She has full range of motion to her lumbar spine. There is no tenderness on palpation to the lumbar paraspinous muscles. Neurologic: Lower extremity deep tendon reflexes are 2+ and equal bilaterally. Sensory is intact. Lower extremity muscle strength is 5/5 and equal including great toe dorsiflexion. show
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
## 2021-06-03 ENCOUNTER — APPOINTMENT (OUTPATIENT)
Dept: OBGYN | Facility: CLINIC | Age: 21
End: 2021-06-03
Payer: MEDICAID

## 2021-06-03 ENCOUNTER — NON-APPOINTMENT (OUTPATIENT)
Age: 21
End: 2021-06-03

## 2021-06-03 VITALS — DIASTOLIC BLOOD PRESSURE: 60 MMHG | TEMPERATURE: 98 F | WEIGHT: 124 LBS | SYSTOLIC BLOOD PRESSURE: 100 MMHG

## 2021-06-03 LAB
BILIRUB UR QL STRIP: NORMAL
GLUCOSE UR-MCNC: NORMAL
HCG UR QL: 1 EU/DL
HGB UR QL STRIP.AUTO: NORMAL
KETONES UR-MCNC: NORMAL
LEUKOCYTE ESTERASE UR QL STRIP: NORMAL
NITRITE UR QL STRIP: NORMAL
PH UR STRIP: 7
PROT UR STRIP-MCNC: NORMAL
SP GR UR STRIP: 1.02

## 2021-06-03 PROCEDURE — 99213 OFFICE O/P EST LOW 20 MIN: CPT

## 2021-06-05 LAB — BACTERIA UR CULT: NORMAL

## 2021-06-22 ENCOUNTER — NON-APPOINTMENT (OUTPATIENT)
Age: 21
End: 2021-06-22

## 2021-06-22 ENCOUNTER — APPOINTMENT (OUTPATIENT)
Dept: OBGYN | Facility: CLINIC | Age: 21
End: 2021-06-22
Payer: MEDICAID

## 2021-06-22 VITALS — WEIGHT: 127 LBS | BODY MASS INDEX: 23.98 KG/M2 | TEMPERATURE: 97.8 F | HEIGHT: 61 IN

## 2021-06-22 PROCEDURE — 76815 OB US LIMITED FETUS(S): CPT

## 2021-06-22 PROCEDURE — 76819 FETAL BIOPHYS PROFIL W/O NST: CPT | Mod: 59

## 2021-06-22 PROCEDURE — 99213 OFFICE O/P EST LOW 20 MIN: CPT

## 2021-06-23 ENCOUNTER — NON-APPOINTMENT (OUTPATIENT)
Age: 21
End: 2021-06-23

## 2021-06-25 ENCOUNTER — LABORATORY RESULT (OUTPATIENT)
Age: 21
End: 2021-06-25

## 2021-07-06 ENCOUNTER — NON-APPOINTMENT (OUTPATIENT)
Age: 21
End: 2021-07-06

## 2021-07-08 ENCOUNTER — APPOINTMENT (OUTPATIENT)
Dept: OBGYN | Facility: CLINIC | Age: 21
End: 2021-07-08
Payer: MEDICAID

## 2021-07-08 ENCOUNTER — NON-APPOINTMENT (OUTPATIENT)
Age: 21
End: 2021-07-08

## 2021-07-08 VITALS — HEIGHT: 61 IN | BODY MASS INDEX: 23.79 KG/M2 | WEIGHT: 126 LBS | TEMPERATURE: 98.2 F

## 2021-07-08 PROCEDURE — 99213 OFFICE O/P EST LOW 20 MIN: CPT

## 2021-07-22 ENCOUNTER — LABORATORY RESULT (OUTPATIENT)
Age: 21
End: 2021-07-22

## 2021-07-22 ENCOUNTER — APPOINTMENT (OUTPATIENT)
Dept: OBGYN | Facility: CLINIC | Age: 21
End: 2021-07-22
Payer: MEDICAID

## 2021-07-22 ENCOUNTER — NON-APPOINTMENT (OUTPATIENT)
Age: 21
End: 2021-07-22

## 2021-07-22 VITALS — TEMPERATURE: 98 F | WEIGHT: 128 LBS | SYSTOLIC BLOOD PRESSURE: 100 MMHG | DIASTOLIC BLOOD PRESSURE: 60 MMHG

## 2021-07-22 PROCEDURE — 99213 OFFICE O/P EST LOW 20 MIN: CPT

## 2021-07-25 ENCOUNTER — NON-APPOINTMENT (OUTPATIENT)
Age: 21
End: 2021-07-25

## 2021-07-25 LAB
BILIRUB UR QL STRIP: NORMAL
GLUCOSE UR-MCNC: NORMAL
GP B STREP DNA SPEC QL NAA+PROBE: DETECTED
GP B STREP DNA SPEC QL NAA+PROBE: NORMAL
HCG UR QL: 0.2 EU/DL
HGB UR QL STRIP.AUTO: NORMAL
KETONES UR-MCNC: NORMAL
LEUKOCYTE ESTERASE UR QL STRIP: NORMAL
NITRITE UR QL STRIP: NORMAL
PH UR STRIP: 7
PROT UR STRIP-MCNC: NORMAL
SOURCE GBS: NORMAL
SP GR UR STRIP: 1.01

## 2021-07-28 ENCOUNTER — NON-APPOINTMENT (OUTPATIENT)
Age: 21
End: 2021-07-28

## 2021-07-29 ENCOUNTER — APPOINTMENT (OUTPATIENT)
Dept: OBGYN | Facility: CLINIC | Age: 21
End: 2021-07-29
Payer: MEDICAID

## 2021-07-29 ENCOUNTER — NON-APPOINTMENT (OUTPATIENT)
Age: 21
End: 2021-07-29

## 2021-07-29 VITALS — HEIGHT: 61 IN | WEIGHT: 130 LBS | BODY MASS INDEX: 24.55 KG/M2 | TEMPERATURE: 98.2 F

## 2021-07-29 PROCEDURE — 99213 OFFICE O/P EST LOW 20 MIN: CPT

## 2021-08-05 ENCOUNTER — APPOINTMENT (OUTPATIENT)
Dept: OBGYN | Facility: CLINIC | Age: 21
End: 2021-08-05
Payer: MEDICAID

## 2021-08-05 VITALS
BODY MASS INDEX: 24.92 KG/M2 | DIASTOLIC BLOOD PRESSURE: 66 MMHG | HEIGHT: 61 IN | WEIGHT: 132 LBS | SYSTOLIC BLOOD PRESSURE: 108 MMHG

## 2021-08-05 PROCEDURE — 99213 OFFICE O/P EST LOW 20 MIN: CPT

## 2021-08-05 PROCEDURE — 81002 URINALYSIS NONAUTO W/O SCOPE: CPT

## 2021-08-06 LAB
BILIRUB UR QL STRIP: NORMAL
GLUCOSE UR-MCNC: NORMAL
HCG UR QL: 0.2 EU/DL
HGB UR QL STRIP.AUTO: NORMAL
KETONES UR-MCNC: NORMAL
LEUKOCYTE ESTERASE UR QL STRIP: NORMAL
NITRITE UR QL STRIP: NORMAL
PH UR STRIP: 7
PROT UR STRIP-MCNC: NORMAL
SP GR UR STRIP: 1.02

## 2021-08-09 ENCOUNTER — APPOINTMENT (OUTPATIENT)
Dept: OBGYN | Facility: CLINIC | Age: 21
End: 2021-08-09
Payer: MEDICAID

## 2021-08-09 ENCOUNTER — NON-APPOINTMENT (OUTPATIENT)
Age: 21
End: 2021-08-09

## 2021-08-09 VITALS — TEMPERATURE: 98 F | HEIGHT: 61 IN | BODY MASS INDEX: 24.73 KG/M2 | WEIGHT: 131 LBS

## 2021-08-09 PROCEDURE — 99213 OFFICE O/P EST LOW 20 MIN: CPT

## 2021-08-12 ENCOUNTER — NON-APPOINTMENT (OUTPATIENT)
Age: 21
End: 2021-08-12

## 2021-08-12 ENCOUNTER — APPOINTMENT (OUTPATIENT)
Dept: OBGYN | Facility: CLINIC | Age: 21
End: 2021-08-12
Payer: MEDICAID

## 2021-08-12 VITALS — WEIGHT: 132 LBS | TEMPERATURE: 98.6 F

## 2021-08-12 PROCEDURE — 99213 OFFICE O/P EST LOW 20 MIN: CPT

## 2021-08-17 ENCOUNTER — APPOINTMENT (OUTPATIENT)
Dept: OBGYN | Facility: CLINIC | Age: 21
End: 2021-08-17
Payer: MEDICAID

## 2021-08-17 ENCOUNTER — NON-APPOINTMENT (OUTPATIENT)
Age: 21
End: 2021-08-17

## 2021-08-17 VITALS — TEMPERATURE: 98 F | BODY MASS INDEX: 25.11 KG/M2 | WEIGHT: 133 LBS | HEIGHT: 61 IN

## 2021-08-17 PROCEDURE — 99213 OFFICE O/P EST LOW 20 MIN: CPT

## 2021-08-19 ENCOUNTER — APPOINTMENT (OUTPATIENT)
Dept: OBGYN | Facility: CLINIC | Age: 21
End: 2021-08-19
Payer: MEDICAID

## 2021-08-19 PROCEDURE — 76815 OB US LIMITED FETUS(S): CPT

## 2021-08-19 PROCEDURE — 76819 FETAL BIOPHYS PROFIL W/O NST: CPT

## 2021-08-20 ENCOUNTER — OUTPATIENT (OUTPATIENT)
Dept: OUTPATIENT SERVICES | Facility: HOSPITAL | Age: 21
LOS: 1 days | Discharge: HOME | End: 2021-08-20

## 2021-08-20 ENCOUNTER — LABORATORY RESULT (OUTPATIENT)
Age: 21
End: 2021-08-20

## 2021-08-21 DIAGNOSIS — Z11.59 ENCOUNTER FOR SCREENING FOR OTHER VIRAL DISEASES: ICD-10-CM

## 2021-08-23 ENCOUNTER — INPATIENT (INPATIENT)
Facility: HOSPITAL | Age: 21
LOS: 2 days | Discharge: HOME | End: 2021-08-26
Attending: OBSTETRICS & GYNECOLOGY | Admitting: OBSTETRICS & GYNECOLOGY
Payer: MEDICAID

## 2021-08-23 VITALS
DIASTOLIC BLOOD PRESSURE: 72 MMHG | HEIGHT: 61 IN | RESPIRATION RATE: 18 BRPM | SYSTOLIC BLOOD PRESSURE: 107 MMHG | TEMPERATURE: 99 F | WEIGHT: 222.67 LBS | HEART RATE: 85 BPM

## 2021-08-23 DIAGNOSIS — O48.0 POST-TERM PREGNANCY: ICD-10-CM

## 2021-08-23 LAB
AMPHET UR-MCNC: NEGATIVE — SIGNIFICANT CHANGE UP
ANISOCYTOSIS BLD QL: SLIGHT — SIGNIFICANT CHANGE UP
APPEARANCE UR: ABNORMAL
BACTERIA # UR AUTO: ABNORMAL
BARBITURATES UR SCN-MCNC: NEGATIVE — SIGNIFICANT CHANGE UP
BASOPHILS # BLD AUTO: 0 K/UL — SIGNIFICANT CHANGE UP (ref 0–0.2)
BASOPHILS NFR BLD AUTO: 0 % — SIGNIFICANT CHANGE UP (ref 0–1)
BENZODIAZ UR-MCNC: NEGATIVE — SIGNIFICANT CHANGE UP
BILIRUB UR-MCNC: NEGATIVE — SIGNIFICANT CHANGE UP
BLD GP AB SCN SERPL QL: SIGNIFICANT CHANGE UP
BUPRENORPHINE SCREEN, URINE RESULT: NEGATIVE — SIGNIFICANT CHANGE UP
COCAINE METAB.OTHER UR-MCNC: NEGATIVE — SIGNIFICANT CHANGE UP
COLOR SPEC: SIGNIFICANT CHANGE UP
DIFF PNL FLD: NEGATIVE — SIGNIFICANT CHANGE UP
EOSINOPHIL # BLD AUTO: 0.11 K/UL — SIGNIFICANT CHANGE UP (ref 0–0.7)
EOSINOPHIL NFR BLD AUTO: 0.9 % — SIGNIFICANT CHANGE UP (ref 0–8)
EPI CELLS # UR: 8 /HPF — HIGH (ref 0–5)
FENTANYL UR QL: NEGATIVE — SIGNIFICANT CHANGE UP
GIANT PLATELETS BLD QL SMEAR: PRESENT — SIGNIFICANT CHANGE UP
GLUCOSE UR QL: NEGATIVE — SIGNIFICANT CHANGE UP
HCT VFR BLD CALC: 39.8 % — SIGNIFICANT CHANGE UP (ref 37–47)
HGB BLD-MCNC: 13.5 G/DL — SIGNIFICANT CHANGE UP (ref 12–16)
HYALINE CASTS # UR AUTO: 2 /LPF — SIGNIFICANT CHANGE UP (ref 0–7)
KETONES UR-MCNC: NEGATIVE — SIGNIFICANT CHANGE UP
L&D DRUG SCREEN, URINE: SIGNIFICANT CHANGE UP
LEUKOCYTE ESTERASE UR-ACNC: ABNORMAL
LYMPHOCYTES # BLD AUTO: 2.45 K/UL — SIGNIFICANT CHANGE UP (ref 1.2–3.4)
LYMPHOCYTES # BLD AUTO: 20.9 % — SIGNIFICANT CHANGE UP (ref 20.5–51.1)
MANUAL SMEAR VERIFICATION: SIGNIFICANT CHANGE UP
MCHC RBC-ENTMCNC: 32 PG — HIGH (ref 27–31)
MCHC RBC-ENTMCNC: 33.9 G/DL — SIGNIFICANT CHANGE UP (ref 32–37)
MCV RBC AUTO: 94.3 FL — SIGNIFICANT CHANGE UP (ref 81–99)
METHADONE UR-MCNC: NEGATIVE — SIGNIFICANT CHANGE UP
MONOCYTES # BLD AUTO: 0.81 K/UL — HIGH (ref 0.1–0.6)
MONOCYTES NFR BLD AUTO: 6.9 % — SIGNIFICANT CHANGE UP (ref 1.7–9.3)
MYELOCYTES NFR BLD: 3.5 % — HIGH (ref 0–0)
NEUTROPHILS # BLD AUTO: 7.93 K/UL — HIGH (ref 1.4–6.5)
NEUTROPHILS NFR BLD AUTO: 67.8 % — SIGNIFICANT CHANGE UP (ref 42.2–75.2)
NITRITE UR-MCNC: NEGATIVE — SIGNIFICANT CHANGE UP
OPIATES UR-MCNC: NEGATIVE — SIGNIFICANT CHANGE UP
OXYCODONE UR-MCNC: NEGATIVE — SIGNIFICANT CHANGE UP
PCP UR-MCNC: NEGATIVE — SIGNIFICANT CHANGE UP
PH UR: 7 — SIGNIFICANT CHANGE UP (ref 5–8)
PLAT MORPH BLD: NORMAL — SIGNIFICANT CHANGE UP
PLATELET # BLD AUTO: 185 K/UL — SIGNIFICANT CHANGE UP (ref 130–400)
POIKILOCYTOSIS BLD QL AUTO: SLIGHT — SIGNIFICANT CHANGE UP
POLYCHROMASIA BLD QL SMEAR: SIGNIFICANT CHANGE UP
PRENATAL SYPHILIS TEST: SIGNIFICANT CHANGE UP
PROPOXYPHENE QUALITATIVE URINE RESULT: NEGATIVE — SIGNIFICANT CHANGE UP
PROT UR-MCNC: NEGATIVE — SIGNIFICANT CHANGE UP
RBC # BLD: 4.22 M/UL — SIGNIFICANT CHANGE UP (ref 4.2–5.4)
RBC # FLD: 13.1 % — SIGNIFICANT CHANGE UP (ref 11.5–14.5)
RBC BLD AUTO: ABNORMAL
RBC CASTS # UR COMP ASSIST: 1 /HPF — SIGNIFICANT CHANGE UP (ref 0–4)
SP GR SPEC: 1.01 — SIGNIFICANT CHANGE UP (ref 1.01–1.03)
UROBILINOGEN FLD QL: SIGNIFICANT CHANGE UP
WBC # BLD: 11.7 K/UL — HIGH (ref 4.8–10.8)
WBC # FLD AUTO: 11.7 K/UL — HIGH (ref 4.8–10.8)
WBC UR QL: 17 /HPF — HIGH (ref 0–5)

## 2021-08-23 PROCEDURE — 59409 OBSTETRICAL CARE: CPT | Mod: U9

## 2021-08-23 RX ORDER — CITRIC ACID/SODIUM CITRATE 300-500 MG
15 SOLUTION, ORAL ORAL EVERY 6 HOURS
Refills: 0 | Status: DISCONTINUED | OUTPATIENT
Start: 2021-08-23 | End: 2021-08-24

## 2021-08-23 RX ORDER — FENTANYL/BUPIVACAINE/NS/PF 2MCG/ML-.1
250 PLASTIC BAG, INJECTION (ML) INJECTION
Refills: 0 | Status: DISCONTINUED | OUTPATIENT
Start: 2021-08-23 | End: 2021-08-26

## 2021-08-23 RX ORDER — DIPHENHYDRAMINE HCL 50 MG
25 CAPSULE ORAL ONCE
Refills: 0 | Status: COMPLETED | OUTPATIENT
Start: 2021-08-23 | End: 2021-08-23

## 2021-08-23 RX ORDER — ONDANSETRON 8 MG/1
4 TABLET, FILM COATED ORAL EVERY 6 HOURS
Refills: 0 | Status: DISCONTINUED | OUTPATIENT
Start: 2021-08-23 | End: 2021-08-26

## 2021-08-23 RX ORDER — NALOXONE HYDROCHLORIDE 4 MG/.1ML
0.1 SPRAY NASAL
Refills: 0 | Status: DISCONTINUED | OUTPATIENT
Start: 2021-08-23 | End: 2021-08-26

## 2021-08-23 RX ORDER — SODIUM CHLORIDE 9 MG/ML
1000 INJECTION, SOLUTION INTRAVENOUS
Refills: 0 | Status: DISCONTINUED | OUTPATIENT
Start: 2021-08-23 | End: 2021-08-24

## 2021-08-23 RX ORDER — OXYTOCIN 10 UNIT/ML
2 VIAL (ML) INJECTION
Qty: 30 | Refills: 0 | Status: DISCONTINUED | OUTPATIENT
Start: 2021-08-23 | End: 2021-08-26

## 2021-08-23 RX ORDER — DINOPROSTONE 10 MG/241MG
10 INSERT VAGINAL ONCE
Refills: 0 | Status: COMPLETED | OUTPATIENT
Start: 2021-08-23 | End: 2021-08-23

## 2021-08-23 RX ORDER — OXYTOCIN 10 UNIT/ML
333.33 VIAL (ML) INJECTION
Qty: 20 | Refills: 0 | Status: DISCONTINUED | OUTPATIENT
Start: 2021-08-23 | End: 2021-08-26

## 2021-08-23 RX ORDER — VANCOMYCIN HCL 1 G
VIAL (EA) INTRAVENOUS
Refills: 0 | Status: DISCONTINUED | OUTPATIENT
Start: 2021-08-23 | End: 2021-08-23

## 2021-08-23 RX ORDER — DIPHENHYDRAMINE HCL 50 MG
25 CAPSULE ORAL EVERY 4 HOURS
Refills: 0 | Status: DISCONTINUED | OUTPATIENT
Start: 2021-08-23 | End: 2021-08-23

## 2021-08-23 RX ORDER — VANCOMYCIN HCL 1 G
1000 VIAL (EA) INTRAVENOUS ONCE
Refills: 0 | Status: COMPLETED | OUTPATIENT
Start: 2021-08-23 | End: 2021-08-23

## 2021-08-23 RX ORDER — VANCOMYCIN HCL 1 G
1000 VIAL (EA) INTRAVENOUS EVERY 12 HOURS
Refills: 0 | Status: DISCONTINUED | OUTPATIENT
Start: 2021-08-23 | End: 2021-08-24

## 2021-08-23 RX ORDER — DEXAMETHASONE 0.5 MG/5ML
4 ELIXIR ORAL EVERY 6 HOURS
Refills: 0 | Status: DISCONTINUED | OUTPATIENT
Start: 2021-08-23 | End: 2021-08-26

## 2021-08-23 RX ORDER — VANCOMYCIN HCL 1 G
1000 VIAL (EA) INTRAVENOUS EVERY 12 HOURS
Refills: 0 | Status: DISCONTINUED | OUTPATIENT
Start: 2021-08-23 | End: 2021-08-23

## 2021-08-23 RX ADMIN — Medication 25 MILLIGRAM(S): at 12:45

## 2021-08-23 RX ADMIN — Medication 125 MILLIGRAM(S): at 23:47

## 2021-08-23 RX ADMIN — Medication 250 MILLIGRAM(S): at 11:47

## 2021-08-23 RX ADMIN — Medication 2 MILLIUNIT(S)/MIN: at 20:02

## 2021-08-23 RX ADMIN — DINOPROSTONE 10 MILLIGRAM(S): 10 INSERT VAGINAL at 11:08

## 2021-08-23 RX ADMIN — Medication 1 ENEMA: at 15:50

## 2021-08-23 RX ADMIN — Medication 10 MILLIGRAM(S): at 14:44

## 2021-08-23 NOTE — OB PROVIDER H&P - NSICDXFAMILYHX_GEN_ALL_CORE_FT
FAMILY HISTORY:  Grandparent  Still living? Unknown  No pertinent family history in first degree relatives, Age at diagnosis: Age Unknown

## 2021-08-23 NOTE — OB PROVIDER IHI INDUCTION/AUGMENTATION NOTE - NS_OBIHIADDITIONDETAILS_OBGYN_ALL_OB_FT
Discussed with patient cervidil for cervical ripening/labor induction, as per Dr. Lawton. Patient verbalized understanding and agreement with plan. Discussed with patient cervidil for cervical ripening/labor induction, as per Dr. Lawton. Patient verbalized understanding and agreement with plan.  Cervidil placed posterior vaginal fornix at 11:10am.

## 2021-08-23 NOTE — OB PROVIDER H&P - NSHPLABSRESULTS_GEN_ALL_CORE
US  19 Aug 2021 @ 40w0d, placenta posterior, EFW 6-5 (2863g), BPP 8/8 22 Jun 2021 BPP 8/8, placenta posterior, vertex  8 April 2021 @20w2d, nl anatomy, placenta posterior  28 Jan 2021, SIUP, EDC 8/19/21

## 2021-08-23 NOTE — PROGRESS NOTE ADULT - ASSESSMENT
21yo  at 40w4d, GBS pos, no complications, admitted for IOL, s/p cervidil, membranes intact, in labor progressing well  -Continue current management   -Pain management prn - anesthesia consulted for epi  -Continuous EFM/toco  - c/w vanc for GBS ppx  -Reevaluate     Dr. spence and  to be made aware.

## 2021-08-23 NOTE — PROCEDURE NOTE - ADDITIONAL PROCEDURE DETAILS
Lumbar epidural performed at L3-4. Standard ASA monitors including FHR. Sterile gloves, betadine prep. 1% lidocaine for local infiltration. 17g touhy. MINDA with air. Touhy needle removed. Catheter secured in place. Negative aspiration. Test dose consisiting of 3ml 1.5% lidocaine with epinephrine was negative. Patient tolerated procedure and was hemodynamically stable throughout. T10 level bilaterally. Epidural infusion consisting of 250ml 0.1% bupivacaine with fentanyl 2mcg/ml at 14ml/hr. Lumbar epidural performed at L3-4. Standard ASA monitors including FHR. Sterile gloves, betadine prep. 1% lidocaine for local infiltration. 17g touhy. MINDA with air. Touhy needle removed. Catheter secured in place. Negative aspiration. Test dose consisiting of 3ml 1.5% lidocaine with epinephrine was negative. Patient tolerated procedure and was hemodynamically stable throughout. T10 level bilaterally. Epidural infusion consisting of 250ml 0.1% bupivacaine with fentanyl 2mcg/ml at 14ml/hr.    23:10: 10 cc bupivacaine 0.125% via epidural catheter

## 2021-08-23 NOTE — OB PROVIDER LABOR PROGRESS NOTE - NS_SUBJECTIVE/OBJECTIVE_OBGYN_ALL_OB_FT
Patient examined as RN reported cervidil string noted hanging from vagina after patient went to the bathroom.

## 2021-08-23 NOTE — OB PROVIDER H&P - ASSESSMENT
20y  @ 40w4d, GBS + (PCN allergic), here for IOL post-EDC.     Admit to L&D  Admission labs  IV hydration  IV antibiotics per protocol: GBS prophylaxis with vancomycin due to PCN allergy (and GBS resistant to clindamycin)  Continuous EFM & TOCO monitoring  Clear Liquid diet  Pain Management PRN  IOL w/cervidil, per Dr. Jered Lawton and Dr. Mendoza PGY-4 aware and in agreement with plan.

## 2021-08-23 NOTE — OB PROVIDER LABOR PROGRESS NOTE - ASSESSMENT
Cervidil insert noted to be in correct location, posterior vaginal fornix. Strings tucked up per introitus, and patient counseled to watch for strings/cervidil insert if she gets up to go to bathroom again. Pt verbalized understanding.

## 2021-08-23 NOTE — OB RN DELIVERY SUMMARY - NSSELHIDDEN_OBGYN_ALL_OB_FT
[NS_DeliveryAttending1_OBGYN_ALL_OB_FT:[id number]] [NS_DeliveryAttending1_OBGYN_ALL_OB_FT:[id number]],[NS_DeliveryRN_OBGYN_ALL_OB_FT:[id number]]

## 2021-08-23 NOTE — PROGRESS NOTE ADULT - SUBJECTIVE AND OBJECTIVE BOX
PGY1    Patient seen and examined at bedside, no current complaints.  Does not desire epidural at this time.       Vital Signs Last 24 Hrs  T(C): 36.9 (23 Aug 2021 11:54), Max: 37.4 (23 Aug 2021 09:52)  T(F): 98.42 (23 Aug 2021 11:54), Max: 99.3 (23 Aug 2021 09:52)  HR: 91 (23 Aug 2021 13:37) (72 - 91)  BP: 112/68 (23 Aug 2021 13:37) (101/64 - 117/56)  RR: 18 (23 Aug 2021 09:52) (18 - 18)  EFM: 125BPM/mod kolby/accels pos  TOCO: q2-4min  SVE: 1.5/70/-2, vtx, intact    Medications:  dinoprostone Insert: 10 milliGRAM(s) (21 @ 11:08)  diphenhydrAMINE   Injectable: 25 milliGRAM(s) (21 @ 12:45)  vancomycin  IVPB: 250 mL/Hr (21 @ 11:47)    Labs:                        13.5   11.70 )-----------( 185      ( 23 Aug 2021 10:20 )             39.8           ABO RH Interpretation: O POS (21 @ 10:19)    Urinalysis Basic - ( 23 Aug 2021 10:22 )    Color: Light Yellow / Appearance: Slightly Turbid / S.013 / pH: x  Gluc: x / Ketone: Negative  / Bili: Negative / Urobili: <2 mg/dL   Blood: x / Protein: Negative / Nitrite: Negative   Leuk Esterase: Large / RBC: 1 /HPF / WBC 17 /HPF   Sq Epi: x / Non Sq Epi: 8 /HPF / Bacteria: Few                 PGY1 Labor Progress Note    Patient seen and examined at bedside, no current complaints.  Does not desire epidural at this time.       Vital Signs Last 24 Hrs  T(C): 36.9 (23 Aug 2021 11:54), Max: 37.4 (23 Aug 2021 09:52)  T(F): 98.42 (23 Aug 2021 11:54), Max: 99.3 (23 Aug 2021 09:52)  HR: 91 (23 Aug 2021 13:37) (72 - 91)  BP: 112/68 (23 Aug 2021 13:37) (101/64 - 117/56)  RR: 18 (23 Aug 2021 09:52) (18 - 18)  EFM: 125BPM/mod kolby/accels pos  TOCO: q2-4min  SVE: 1.5/70/-2, vtx, intact at     Medications:  dinoprostone Insert: 10 milliGRAM(s) (21 @ 11:08)  diphenhydrAMINE   Injectable: 25 milliGRAM(s) (21 @ 12:45)  vancomycin  IVPB: 250 mL/Hr (21 @ 11:47)    Labs:                        13.5   11.70 )-----------( 185      ( 23 Aug 2021 10:20 )             39.8           ABO RH Interpretation: O POS (21 @ 10:19)    Urinalysis Basic - ( 23 Aug 2021 10:22 )    Color: Light Yellow / Appearance: Slightly Turbid / S.013 / pH: x  Gluc: x / Ketone: Negative  / Bili: Negative / Urobili: <2 mg/dL   Blood: x / Protein: Negative / Nitrite: Negative   Leuk Esterase: Large / RBC: 1 /HPF / WBC 17 /HPF   Sq Epi: x / Non Sq Epi: 8 /HPF / Bacteria: Few

## 2021-08-23 NOTE — PROGRESS NOTE ADULT - ASSESSMENT
A/P: 19yo  at 40w4d, GBS pos, for IOL at term, s/p cervidil, in early labor.  -cont EFM/toco  -clear liquid diet, IVF  -pain management with epidural  -AROM after epidural  -continue to monitor    Dr. Jered spence.

## 2021-08-23 NOTE — PROGRESS NOTE ADULT - SUBJECTIVE AND OBJECTIVE BOX
PGY 3 Note    Patient seen at bedside for evaluation of labor progression.  Comfortable s/p epidural.  No other complaints.    Vital Signs Last 24 Hrs  T(C): 36.7 (23 Aug 2021 18:53), Max: 37.4 (23 Aug 2021 09:52)  T(F): 98.06 (23 Aug 2021 18:53), Max: 99.3 (23 Aug 2021 09:52)  HR: 78 (23 Aug 2021 19:24) (64 - 93)  BP: 117/66 (23 Aug 2021 19:24) (101/64 - 117/66)  RR: 18 (23 Aug 2021 09:52) (18 - 18)      EFM:125/mod/+accel  TOCO: q2-3m  SVE: 3/-2 per Dr. Lawton    Labs:                        13.5   11.70 )-----------( 185      ( 23 Aug 2021 10:20 )             39.8           ABO RH Interpretation: O POS (21 @ 10:19)    Urinalysis Basic - ( 23 Aug 2021 10:22 )    Color: Light Yellow / Appearance: Slightly Turbid / S.013 / pH: x  Gluc: x / Ketone: Negative  / Bili: Negative / Urobili: <2 mg/dL   Blood: x / Protein: Negative / Nitrite: Negative   Leuk Esterase: Large / RBC: 1 /HPF / WBC 17 /HPF   Sq Epi: x / Non Sq Epi: 8 /HPF / Bacteria: Few    UDS neg  RPR NR  COVID-19 neg    Meds: citric acid/sodium citrate Solution 15 milliLiter(s) Oral every 6 hours  dextrose 5% + lactated ringers. 1000 milliLiter(s) IV Continuous <Continuous>  fentanyl (2 MICROgram(s)/mL) + bupivacaine 0.1%  in Sodium Chloride 0.9% Epidural Drip 250 milliLiter(s) Epidural Drip <Continuous>, started @1915  lactated ringers. 1000 milliLiter(s) IV Continuous <Continuous>  oxytocin Infusion 333.333 milliUNIT(s)/Min IV Continuous <Continuous>  vancomycin  IVPB 1000 milliGRAM(s) IV Intermittent every 12 hours  bisacodyl Suppository 10 milliGRAM(s) Rectal once  dinoprostone Insert 10 milliGRAM(s) Vaginal once  diphenhydrAMINE   Injectable 25 milliGRAM(s) IV Push once  saline laxative (FLEET) Rectal Enema 1 Enema Rectal once  vancomycin  IVPB 1000 milliGRAM(s) IV Intermittent once       Patient called stating that the [facility] needs patients refill request from 3.4.20 of patients atorvastatin (LIPITOR) 10 MG tablet and hydrochlorothiazide (MICROZIDE) 12.5 MG capsule resent as it over lapped the last one.     Please call [facility] if you have any questions.

## 2021-08-23 NOTE — OB RN PATIENT PROFILE - NS_OBGYNHISTORY_OBGYN_ALL_OB_FT
OB Hx:  Hx of medical ETOP x2 "a few years ago"    Gyn Hx:  Denies cysts, fibroids, abnormal paps, and STIs.

## 2021-08-23 NOTE — PROCEDURE NOTE - GENERAL PROCEDURE DETAILS
Anesthesia Pre Eval Note    Anesthesia ROS/Med Hx        Anesthetic Complication History:  Patient does not have a history of anesthetic complications      Pulmonary Review:  Patient does not have a pulmonary history      Neuro/Psych Review:  Patient does not have a neuro/psych history       Cardiovascular Review:  Patient does not have a cardiovascular history   Exercise tolerance: good (>4 METS)    GI/HEPATIC/RENAL Review:  Patient does not have a GI/hepatic/renalhistory       End/Other Review:  Patient does not have an endo/other history        Relevant Problems   No relevant active problems       Physical Exam     Airway   Mallampati: I  TM Distance: >3 FB  Neck ROM: Full  TMJ Mobility: Good    Cardiovascular  Cardiovascular exam normal    Head Assessment  Head assessment: Normocephalic and Atraumatic    General Assessment  General Assessment: Alert and oriented and No acute distress    Dental Exam  Dental exam normal    Pulmonary Exam  Pulmonary exam normal    Abdominal Exam  Abdominal exam normal      Anesthesia Plan    ASA Status: 1    Anesthesia Type: General    Induction: Intravenous  Preferred Airway Type: ETT  Premedication: None      Post-op Pain Management: Per Surgeon      Checklist  Reviewed: Allergies, Past Med History, Patient Summary, Lab Results, Medications, Beta Blocker Status, Nursing Notes, Problem list, Consultations and NPO Status    Informed Consent  The proposed anesthetic plan, including its risks and benefits, have been discussed with the Patient  - along with the risks and benefits of alternatives.  Questions were encouraged and answered and the patient and/or representative understands and agrees to proceed.     Blood Products: Not Anticipated    
Uneventful

## 2021-08-23 NOTE — OB RN DELIVERY SUMMARY - BABY A: APGAR 1 MIN COLOR, DELIVERY
What Type Of Note Output Would You Prefer (Optional)?: Standard Output How Severe Is Your Rash?: moderate Is This A New Presentation, Or A Follow-Up?: Rash (1) body pink, extremities blue

## 2021-08-23 NOTE — OB PROVIDER H&P - NSHPPHYSICALEXAM_GEN_ALL_CORE
T(C): 37.4 (08-23-21 @ 09:52), Max: 37.4 (08-23-21 @ 09:52)  HR: 85 (08-23-21 @ 09:52) (85 - 85)  BP: 107/72 (08-23-21 @ 09:52) (107/72 - 107/72)  RR: 18 (08-23-21 @ 09:52) (18 - 18)    General: alert, oriented, no acute distress  Abd: soft, gravid, nontender  Extremities: no sign of DVT on exam    EFM: 135 bpm, moderate variability, +accels/no decels (Cat 1)  TOCO: irregular    SVE: 1.5/70/-2, vertex, intact

## 2021-08-23 NOTE — OB RN PATIENT PROFILE - NS_ZIKATRAVEL_OBGYN_ALL_OB
VITALS: reviewed  GEN: NAD, cachectic, A & O x 4  HEAD/EYES: NCAT, EOMI, anicteric sclerae  ENT: mucus membranes dry, oropharynx WNL, trachea midline, no JVD  RESP: lungs CTA with equal breath sounds bilaterally, chest wall nontender and atraumatic  CV: heart with reg rhythm S1, S2, distal pulses intact and symmetric bilaterally  ABDOMEN: normoactive bowel sounds, soft, nondistended, nontender, no palpable masses  : no CVAT  MSK: extremities without edema. L leg shortened and externally rotated with ttp over L hip. the back is without midline or lateral tenderness, there is no spinal deformity or stepoff and the back is ranged painlessly. the neck has no midline tenderness, deformity, or stepoff, and is ranged painlessly.  SKIN: warm, dry, no rash, no bruising, no cyanosis. color appropriate for ethnicity  NEURO: alert, mentating appropriately, no facial asymmetry. gross sensation, motor, are intact  PSYCH: Affect appropriate No

## 2021-08-23 NOTE — PROGRESS NOTE ADULT - SUBJECTIVE AND OBJECTIVE BOX
PGY2 Note    Patient seen at bedside for labor progression. No complaints at the moment.    T(F): 98.42 ( @ 11:54), Max: 99.3 ( @ 09:52)  HR: 93 ( @ 18:25)  BP: 105/64 ( @ 18:25) (101/64 - 117/56)  RR: 18 ( @ 09:52)    EFM: 120/mod kolby/+accels   TOCO: q2-3min   SVE: 3/90/-1/vtx/intact (exam by ), cervidil fell out @1524    Medications:  bisacodyl Suppository: 10 ( @ 14:44)  dinoprostone Insert: 10 ( @ 11:08)  diphenhydrAMINE   Injectable: 25 ( @ 12:45)  saline laxative (FLEET) Rectal Enema: 1 ( @ 15:50)  vancomycin  IVPB: 250 ( @ 11:47)      Labs:                        13.5   11.70 )-----------( 185      ( 23 Aug 2021 10:20 )             39.8           Prenatal Syphilis Test: Nonreact ( @ 10:20)  Antibody Screen: NEG (21 @ 10:19)    Urinalysis Basic - ( 23 Aug 2021 10:22 )    Color: Light Yellow / Appearance: Slightly Turbid / S.013 / pH: x  Gluc: x / Ketone: Negative  / Bili: Negative / Urobili: <2 mg/dL   Blood: x / Protein: Negative / Nitrite: Negative   Leuk Esterase: Large / RBC: 1 /HPF / WBC 17 /HPF   Sq Epi: x / Non Sq Epi: 8 /HPF / Bacteria: Few    COVID19 PCR neg  UDS neg

## 2021-08-23 NOTE — PROGRESS NOTE ADULT - ASSESSMENT
A/P:   19yo  at 40w4d, GBS pos, no complications, in labor.   - current management  - continuous toco/EFM  - pain management PRN  - vancomycin for GBS pps  - continue cervidil until     Dr. Mendoza aware, Dr. Lawton to be made aware   A/P: 19yo  at 40w4d, GBS pos, no complications, admitted for IOL, cervidil in place, membranes intact   - continue cervidil until 0, earlier PRN  - continuous toco/EFM  - pain management PRN  - vancomycin for GBS ppx (rate decreased to 125ml/h given scalp pruritis, low concern for red man syndrome)    Dr. Mendoza aware, Dr. Lawton to be made aware

## 2021-08-24 LAB
BASOPHILS # BLD AUTO: 0.05 K/UL — SIGNIFICANT CHANGE UP (ref 0–0.2)
BASOPHILS NFR BLD AUTO: 0.3 % — SIGNIFICANT CHANGE UP (ref 0–1)
COVID-19 SPIKE DOMAIN AB INTERP: POSITIVE
COVID-19 SPIKE DOMAIN ANTIBODY RESULT: 3.18 U/ML — HIGH
EOSINOPHIL # BLD AUTO: 0.22 K/UL — SIGNIFICANT CHANGE UP (ref 0–0.7)
EOSINOPHIL NFR BLD AUTO: 1.5 % — SIGNIFICANT CHANGE UP (ref 0–8)
HCT VFR BLD CALC: 33.4 % — LOW (ref 37–47)
HGB BLD-MCNC: 11.1 G/DL — LOW (ref 12–16)
IMM GRANULOCYTES NFR BLD AUTO: 2.6 % — HIGH (ref 0.1–0.3)
LYMPHOCYTES # BLD AUTO: 1.92 K/UL — SIGNIFICANT CHANGE UP (ref 1.2–3.4)
LYMPHOCYTES # BLD AUTO: 13.1 % — LOW (ref 20.5–51.1)
MCHC RBC-ENTMCNC: 32.2 PG — HIGH (ref 27–31)
MCHC RBC-ENTMCNC: 33.2 G/DL — SIGNIFICANT CHANGE UP (ref 32–37)
MCV RBC AUTO: 96.8 FL — SIGNIFICANT CHANGE UP (ref 81–99)
MONOCYTES # BLD AUTO: 1.27 K/UL — HIGH (ref 0.1–0.6)
MONOCYTES NFR BLD AUTO: 8.6 % — SIGNIFICANT CHANGE UP (ref 1.7–9.3)
NEUTROPHILS # BLD AUTO: 10.86 K/UL — HIGH (ref 1.4–6.5)
NEUTROPHILS NFR BLD AUTO: 73.9 % — SIGNIFICANT CHANGE UP (ref 42.2–75.2)
NRBC # BLD: 0 /100 WBCS — SIGNIFICANT CHANGE UP (ref 0–0)
PLATELET # BLD AUTO: 166 K/UL — SIGNIFICANT CHANGE UP (ref 130–400)
RBC # BLD: 3.45 M/UL — LOW (ref 4.2–5.4)
RBC # FLD: 13.2 % — SIGNIFICANT CHANGE UP (ref 11.5–14.5)
SARS-COV-2 IGG+IGM SERPL QL IA: 3.18 U/ML — HIGH
SARS-COV-2 IGG+IGM SERPL QL IA: POSITIVE
WBC # BLD: 14.7 K/UL — HIGH (ref 4.8–10.8)
WBC # FLD AUTO: 14.7 K/UL — HIGH (ref 4.8–10.8)

## 2021-08-24 RX ORDER — OXYTOCIN 10 UNIT/ML
333.33 VIAL (ML) INJECTION
Qty: 20 | Refills: 0 | Status: DISCONTINUED | OUTPATIENT
Start: 2021-08-24 | End: 2021-08-26

## 2021-08-24 RX ORDER — IBUPROFEN 200 MG
600 TABLET ORAL EVERY 6 HOURS
Refills: 0 | Status: COMPLETED | OUTPATIENT
Start: 2021-08-24 | End: 2022-07-23

## 2021-08-24 RX ORDER — OXYCODONE HYDROCHLORIDE 5 MG/1
5 TABLET ORAL
Refills: 0 | Status: DISCONTINUED | OUTPATIENT
Start: 2021-08-24 | End: 2021-08-26

## 2021-08-24 RX ORDER — HYDROCORTISONE 1 %
1 OINTMENT (GRAM) TOPICAL EVERY 6 HOURS
Refills: 0 | Status: DISCONTINUED | OUTPATIENT
Start: 2021-08-24 | End: 2021-08-26

## 2021-08-24 RX ORDER — LANOLIN
1 OINTMENT (GRAM) TOPICAL EVERY 6 HOURS
Refills: 0 | Status: DISCONTINUED | OUTPATIENT
Start: 2021-08-24 | End: 2021-08-26

## 2021-08-24 RX ORDER — OXYCODONE HYDROCHLORIDE 5 MG/1
5 TABLET ORAL ONCE
Refills: 0 | Status: DISCONTINUED | OUTPATIENT
Start: 2021-08-24 | End: 2021-08-26

## 2021-08-24 RX ORDER — KETOROLAC TROMETHAMINE 30 MG/ML
30 SYRINGE (ML) INJECTION ONCE
Refills: 0 | Status: DISCONTINUED | OUTPATIENT
Start: 2021-08-24 | End: 2021-08-24

## 2021-08-24 RX ORDER — AER TRAVELER 0.5 G/1
1 SOLUTION RECTAL; TOPICAL EVERY 4 HOURS
Refills: 0 | Status: DISCONTINUED | OUTPATIENT
Start: 2021-08-24 | End: 2021-08-26

## 2021-08-24 RX ORDER — TETANUS TOXOID, REDUCED DIPHTHERIA TOXOID AND ACELLULAR PERTUSSIS VACCINE, ADSORBED 5; 2.5; 8; 8; 2.5 [IU]/.5ML; [IU]/.5ML; UG/.5ML; UG/.5ML; UG/.5ML
0.5 SUSPENSION INTRAMUSCULAR ONCE
Refills: 0 | Status: DISCONTINUED | OUTPATIENT
Start: 2021-08-24 | End: 2021-08-26

## 2021-08-24 RX ORDER — BENZOCAINE 10 %
1 GEL (GRAM) MUCOUS MEMBRANE EVERY 6 HOURS
Refills: 0 | Status: DISCONTINUED | OUTPATIENT
Start: 2021-08-24 | End: 2021-08-26

## 2021-08-24 RX ORDER — DIBUCAINE 1 %
1 OINTMENT (GRAM) RECTAL EVERY 6 HOURS
Refills: 0 | Status: DISCONTINUED | OUTPATIENT
Start: 2021-08-24 | End: 2021-08-26

## 2021-08-24 RX ORDER — DIPHENHYDRAMINE HCL 50 MG
25 CAPSULE ORAL EVERY 6 HOURS
Refills: 0 | Status: DISCONTINUED | OUTPATIENT
Start: 2021-08-24 | End: 2021-08-26

## 2021-08-24 RX ORDER — MAGNESIUM HYDROXIDE 400 MG/1
30 TABLET, CHEWABLE ORAL
Refills: 0 | Status: DISCONTINUED | OUTPATIENT
Start: 2021-08-24 | End: 2021-08-26

## 2021-08-24 RX ORDER — SIMETHICONE 80 MG/1
80 TABLET, CHEWABLE ORAL EVERY 4 HOURS
Refills: 0 | Status: DISCONTINUED | OUTPATIENT
Start: 2021-08-24 | End: 2021-08-26

## 2021-08-24 RX ORDER — SODIUM CHLORIDE 9 MG/ML
3 INJECTION INTRAMUSCULAR; INTRAVENOUS; SUBCUTANEOUS EVERY 8 HOURS
Refills: 0 | Status: DISCONTINUED | OUTPATIENT
Start: 2021-08-24 | End: 2021-08-26

## 2021-08-24 RX ORDER — ACETAMINOPHEN 500 MG
975 TABLET ORAL
Refills: 0 | Status: DISCONTINUED | OUTPATIENT
Start: 2021-08-24 | End: 2021-08-26

## 2021-08-24 RX ORDER — PRAMOXINE HYDROCHLORIDE 150 MG/15G
1 AEROSOL, FOAM RECTAL EVERY 4 HOURS
Refills: 0 | Status: DISCONTINUED | OUTPATIENT
Start: 2021-08-24 | End: 2021-08-26

## 2021-08-24 RX ORDER — IBUPROFEN 200 MG
600 TABLET ORAL EVERY 6 HOURS
Refills: 0 | Status: DISCONTINUED | OUTPATIENT
Start: 2021-08-24 | End: 2021-08-26

## 2021-08-24 RX ADMIN — Medication 1000 MILLIUNIT(S)/MIN: at 03:09

## 2021-08-24 RX ADMIN — SODIUM CHLORIDE 3 MILLILITER(S): 9 INJECTION INTRAMUSCULAR; INTRAVENOUS; SUBCUTANEOUS at 06:55

## 2021-08-24 RX ADMIN — Medication 600 MILLIGRAM(S): at 13:30

## 2021-08-24 RX ADMIN — Medication 975 MILLIGRAM(S): at 17:16

## 2021-08-24 RX ADMIN — Medication 975 MILLIGRAM(S): at 09:20

## 2021-08-24 RX ADMIN — Medication 1 TABLET(S): at 12:49

## 2021-08-24 RX ADMIN — Medication 975 MILLIGRAM(S): at 21:48

## 2021-08-24 RX ADMIN — Medication 600 MILLIGRAM(S): at 07:32

## 2021-08-24 RX ADMIN — Medication 975 MILLIGRAM(S): at 23:22

## 2021-08-24 RX ADMIN — Medication 600 MILLIGRAM(S): at 19:03

## 2021-08-24 RX ADMIN — Medication 30 MILLIGRAM(S): at 03:07

## 2021-08-24 RX ADMIN — Medication 975 MILLIGRAM(S): at 10:00

## 2021-08-24 RX ADMIN — Medication 975 MILLIGRAM(S): at 15:12

## 2021-08-24 RX ADMIN — SODIUM CHLORIDE 3 MILLILITER(S): 9 INJECTION INTRAMUSCULAR; INTRAVENOUS; SUBCUTANEOUS at 17:16

## 2021-08-24 RX ADMIN — SODIUM CHLORIDE 3 MILLILITER(S): 9 INJECTION INTRAMUSCULAR; INTRAVENOUS; SUBCUTANEOUS at 21:48

## 2021-08-24 RX ADMIN — Medication 600 MILLIGRAM(S): at 14:00

## 2021-08-24 RX ADMIN — Medication 600 MILLIGRAM(S): at 19:26

## 2021-08-24 NOTE — PROGRESS NOTE ADULT - SUBJECTIVE AND OBJECTIVE BOX
In birthing room. Atraumatic  viable baby girl over intact perineum. one loose nuchal cord easily reduced. Baby suctioned after delivery. delayed cord clajmping done. cord clamped x 2 and cut and baby handed to nurse and to mother.; Cord bloods taken for gases and routine. Placenta delivered spontaneously and appears intact.  cervix, vagina and perineum checked. small 1-2 cm superficial lacerations noted upper inner labia noted and approximated with interrupted 3-0 chromic suture.  Small rt introital superficial laceration reapproximated with single 2-0 chormic sutrue , figure of eight. good hemostasis noted. apgars 9-9.

## 2021-08-25 ENCOUNTER — TRANSCRIPTION ENCOUNTER (OUTPATIENT)
Age: 21
End: 2021-08-25

## 2021-08-25 RX ORDER — IBUPROFEN 200 MG
1 TABLET ORAL
Qty: 0 | Refills: 0 | DISCHARGE
Start: 2021-08-25

## 2021-08-25 RX ORDER — ACETAMINOPHEN 500 MG
3 TABLET ORAL
Qty: 0 | Refills: 0 | DISCHARGE
Start: 2021-08-25

## 2021-08-25 RX ADMIN — Medication 600 MILLIGRAM(S): at 22:24

## 2021-08-25 RX ADMIN — SODIUM CHLORIDE 3 MILLILITER(S): 9 INJECTION INTRAMUSCULAR; INTRAVENOUS; SUBCUTANEOUS at 13:46

## 2021-08-25 RX ADMIN — Medication 600 MILLIGRAM(S): at 06:24

## 2021-08-25 RX ADMIN — Medication 975 MILLIGRAM(S): at 04:19

## 2021-08-25 RX ADMIN — SODIUM CHLORIDE 3 MILLILITER(S): 9 INJECTION INTRAMUSCULAR; INTRAVENOUS; SUBCUTANEOUS at 06:23

## 2021-08-25 RX ADMIN — Medication 600 MILLIGRAM(S): at 00:51

## 2021-08-25 RX ADMIN — Medication 600 MILLIGRAM(S): at 07:03

## 2021-08-25 RX ADMIN — Medication 600 MILLIGRAM(S): at 12:15

## 2021-08-25 RX ADMIN — MAGNESIUM HYDROXIDE 30 MILLILITER(S): 400 TABLET, CHEWABLE ORAL at 18:53

## 2021-08-25 RX ADMIN — Medication 975 MILLIGRAM(S): at 14:54

## 2021-08-25 RX ADMIN — Medication 975 MILLIGRAM(S): at 15:30

## 2021-08-25 RX ADMIN — Medication 600 MILLIGRAM(S): at 22:54

## 2021-08-25 RX ADMIN — Medication 975 MILLIGRAM(S): at 03:58

## 2021-08-25 RX ADMIN — Medication 1 TABLET(S): at 12:00

## 2021-08-25 RX ADMIN — Medication 600 MILLIGRAM(S): at 01:56

## 2021-08-25 RX ADMIN — Medication 975 MILLIGRAM(S): at 09:01

## 2021-08-25 NOTE — DISCHARGE NOTE OB - CARE PROVIDER_API CALL
Preston Lawton  Obstetrics and Gynecology  [address] [place] [address]  Phone: [phone]  Fax: [phone]  Follow Up Time:

## 2021-08-25 NOTE — DISCHARGE NOTE OB - PATIENT PORTAL LINK FT
You can access the FollowMyHealth Patient Portal offered by [facility] by registering at the following website: [email or web address]. By joining [email or web address]’s FollowMyHealth portal, you will also be able to view your health information using other applications (apps) compatible with our system.

## 2021-08-25 NOTE — PROGRESS NOTE ADULT - SUBJECTIVE AND OBJECTIVE BOX
Patient seen on rounds at 8am today.  Post partum day # 1 1/2  VS stble;   No complaints.  Ambulatory, voiding without complaints; tolerating diet; nursing and no breast complaints  Passed a clot, approximately 4-5 cm yesterday; no clots since.  PE;abdomen; soft, no masses; no tenderness to palpation; uterus firm, non tender, well below umbilicus       perineum without any signs of swelling or induration       lochia light       no calf tenderness    imp; status post  doing well    plan: discharge home; all instructions given; fina bottle and witch hazel pads encouraged; follow up post partum appt 6 wks.

## 2021-08-25 NOTE — DISCHARGE NOTE OB - PLAN OF CARE
No heavy lifting. Nothing inside the vagina for 6 weeks: no tampons, douching, sexual intercourse, tub baths or pools. If you have a fever over 100.4F, severe abdominal pain or heavy vaginal bleeding please call your doctor or go to the emergency room.

## 2021-08-26 VITALS
SYSTOLIC BLOOD PRESSURE: 101 MMHG | HEART RATE: 77 BPM | DIASTOLIC BLOOD PRESSURE: 56 MMHG | TEMPERATURE: 97 F | RESPIRATION RATE: 19 BRPM

## 2021-08-26 RX ADMIN — Medication 600 MILLIGRAM(S): at 04:18

## 2021-08-26 RX ADMIN — Medication 600 MILLIGRAM(S): at 11:57

## 2021-08-26 RX ADMIN — Medication 975 MILLIGRAM(S): at 01:54

## 2021-08-26 RX ADMIN — Medication 975 MILLIGRAM(S): at 08:40

## 2021-08-26 RX ADMIN — Medication 600 MILLIGRAM(S): at 05:00

## 2021-08-26 RX ADMIN — Medication 975 MILLIGRAM(S): at 01:24

## 2021-08-26 RX ADMIN — Medication 1 TABLET(S): at 11:56

## 2021-08-26 RX ADMIN — Medication 975 MILLIGRAM(S): at 09:40

## 2021-08-26 RX ADMIN — Medication 975 MILLIGRAM(S): at 15:01

## 2021-08-31 DIAGNOSIS — O48.0 POST-TERM PREGNANCY: ICD-10-CM

## 2021-08-31 DIAGNOSIS — L29.8 OTHER PRURITUS: ICD-10-CM

## 2021-08-31 DIAGNOSIS — Z34.80 ENCOUNTER FOR SUPERVISION OF OTHER NORMAL PREGNANCY, UNSPECIFIED TRIMESTER: ICD-10-CM

## 2021-08-31 DIAGNOSIS — Z88.1 ALLERGY STATUS TO OTHER ANTIBIOTIC AGENTS STATUS: ICD-10-CM

## 2021-08-31 DIAGNOSIS — Z3A.40 40 WEEKS GESTATION OF PREGNANCY: ICD-10-CM

## 2021-10-11 ENCOUNTER — APPOINTMENT (OUTPATIENT)
Dept: OBGYN | Facility: CLINIC | Age: 21
End: 2021-10-11
Payer: MEDICAID

## 2021-10-11 VITALS — TEMPERATURE: 98 F | HEIGHT: 61 IN | BODY MASS INDEX: 20.96 KG/M2 | WEIGHT: 111 LBS

## 2021-10-11 DIAGNOSIS — Z34.90 ENCOUNTER FOR SUPERVISION OF NORMAL PREGNANCY, UNSPECIFIED, UNSPECIFIED TRIMESTER: ICD-10-CM

## 2021-10-11 PROCEDURE — 99213 OFFICE O/P EST LOW 20 MIN: CPT

## 2022-01-17 RX ORDER — BUPROPION HYDROCHLORIDE 150 MG/1
150 TABLET, EXTENDED RELEASE ORAL DAILY
Qty: 30 | Refills: 3 | Status: ACTIVE | COMMUNITY
Start: 2022-01-17 | End: 1900-01-01

## 2022-03-17 ENCOUNTER — NON-APPOINTMENT (OUTPATIENT)
Age: 22
End: 2022-03-17

## 2022-04-11 ENCOUNTER — APPOINTMENT (OUTPATIENT)
Dept: OBGYN | Facility: CLINIC | Age: 22
End: 2022-04-11

## 2022-11-07 NOTE — OB PROVIDER H&P - NS_ADMITDT_OBGYN_ALL_OB_DT
Continue bowel regimen   Return if worsening or recurrent pain   Fu with pcp in 3 days   23-Aug-2021 10:00

## 2024-09-24 NOTE — DISCHARGE NOTE OB - CARE PLAN
1 Principal Discharge DX:	Normal vaginal delivery  Assessment and plan of treatment:	No heavy lifting. Nothing inside the vagina for 6 weeks: no tampons, douching, sexual intercourse, tub baths or pools. If you have a fever over 100.4F, severe abdominal pain or heavy vaginal bleeding please call your doctor or go to the emergency room.   no
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
